# Patient Record
Sex: FEMALE | Race: WHITE | NOT HISPANIC OR LATINO | Employment: OTHER | URBAN - METROPOLITAN AREA
[De-identification: names, ages, dates, MRNs, and addresses within clinical notes are randomized per-mention and may not be internally consistent; named-entity substitution may affect disease eponyms.]

---

## 2021-04-20 ENCOUNTER — RECORDS - HEALTHEAST (OUTPATIENT)
Dept: LAB | Facility: CLINIC | Age: 71
End: 2021-04-20

## 2021-04-20 LAB
SARS-COV-2 PCR COMMENT: ABNORMAL
SARS-COV-2 RNA SPEC QL NAA+PROBE: POSITIVE
SARS-COV-2 VIRUS SPECIMEN SOURCE: ABNORMAL

## 2021-04-24 ENCOUNTER — RECORDS - HEALTHEAST (OUTPATIENT)
Dept: LAB | Facility: CLINIC | Age: 71
End: 2021-04-24

## 2021-05-30 ENCOUNTER — RECORDS - HEALTHEAST (OUTPATIENT)
Dept: ADMINISTRATIVE | Facility: CLINIC | Age: 71
End: 2021-05-30

## 2022-08-18 ENCOUNTER — HOSPITAL ENCOUNTER (INPATIENT)
Facility: HOSPITAL | Age: 72
LOS: 4 days | Discharge: HOME | DRG: 280 | End: 2022-08-22
Attending: EMERGENCY MEDICINE | Admitting: INTERNAL MEDICINE
Payer: MEDICARE

## 2022-08-18 ENCOUNTER — APPOINTMENT (OUTPATIENT)
Dept: RADIOLOGY | Facility: HOSPITAL | Age: 72
DRG: 280 | End: 2022-08-18
Attending: EMERGENCY MEDICINE
Payer: MEDICARE

## 2022-08-18 ENCOUNTER — APPOINTMENT (OUTPATIENT)
Dept: CARDIOLOGY | Facility: HOSPITAL | Age: 72
DRG: 280 | End: 2022-08-18
Attending: EMERGENCY MEDICINE
Payer: MEDICARE

## 2022-08-18 ENCOUNTER — APPOINTMENT (OUTPATIENT)
Dept: CT IMAGING | Facility: HOSPITAL | Age: 72
DRG: 280 | End: 2022-08-18
Attending: EMERGENCY MEDICINE
Payer: MEDICARE

## 2022-08-18 DIAGNOSIS — J81.0 ACUTE PULMONARY EDEMA (H): ICD-10-CM

## 2022-08-18 DIAGNOSIS — J96.01 ACUTE RESPIRATORY FAILURE WITH HYPOXIA (H): ICD-10-CM

## 2022-08-18 DIAGNOSIS — I21.4 NSTEMI (NON-ST ELEVATED MYOCARDIAL INFARCTION) (H): Primary | ICD-10-CM

## 2022-08-18 DIAGNOSIS — E87.29 RESPIRATORY ACIDOSIS: ICD-10-CM

## 2022-08-18 PROBLEM — J81.1 PULMONARY EDEMA: Status: ACTIVE | Noted: 2022-08-18

## 2022-08-18 LAB
ALBUMIN SERPL-MCNC: 3.4 G/DL (ref 3.5–5)
ALP SERPL-CCNC: 112 U/L (ref 45–120)
ALT SERPL W P-5'-P-CCNC: 43 U/L (ref 0–45)
ANION GAP SERPL CALCULATED.3IONS-SCNC: 12 MMOL/L (ref 5–18)
APTT PPP: 27 SECONDS (ref 22–38)
AST SERPL W P-5'-P-CCNC: 52 U/L (ref 0–40)
ATRIAL RATE - MUSE: 122 BPM
ATRIAL RATE - MUSE: 134 BPM
BASE EXCESS BLDV CALC-SCNC: -10.8 MMOL/L
BASOPHILS # BLD MANUAL: 0 10E3/UL (ref 0–0.2)
BASOPHILS NFR BLD MANUAL: 0 %
BILIRUB SERPL-MCNC: 0.6 MG/DL (ref 0–1)
BNP SERPL-MCNC: 1016 PG/ML (ref 0–127)
BUN SERPL-MCNC: 20 MG/DL (ref 8–28)
CALCIUM SERPL-MCNC: 8.7 MG/DL (ref 8.5–10.5)
CHLORIDE BLD-SCNC: 108 MMOL/L (ref 98–107)
CO2 SERPL-SCNC: 20 MMOL/L (ref 22–31)
CREAT SERPL-MCNC: 0.88 MG/DL (ref 0.6–1.1)
CREAT SERPL-MCNC: 1.18 MG/DL (ref 0.6–1.1)
D DIMER PPP FEU-MCNC: 1.37 UG/ML FEU (ref 0–0.5)
DIASTOLIC BLOOD PRESSURE - MUSE: NORMAL MMHG
DIASTOLIC BLOOD PRESSURE - MUSE: NORMAL MMHG
EOSINOPHIL # BLD MANUAL: 0.2 10E3/UL (ref 0–0.7)
EOSINOPHIL NFR BLD MANUAL: 1 %
ERYTHROCYTE [DISTWIDTH] IN BLOOD BY AUTOMATED COUNT: 14.3 % (ref 10–15)
ERYTHROCYTE [DISTWIDTH] IN BLOOD BY AUTOMATED COUNT: 14.5 % (ref 10–15)
GFR SERPL CREATININE-BSD FRML MDRD: 49 ML/MIN/1.73M2
GFR SERPL CREATININE-BSD FRML MDRD: 69 ML/MIN/1.73M2
GLUCOSE BLD-MCNC: 376 MG/DL (ref 70–125)
GLUCOSE BLDC GLUCOMTR-MCNC: 107 MG/DL (ref 70–99)
GLUCOSE BLDC GLUCOMTR-MCNC: 122 MG/DL (ref 70–99)
GLUCOSE BLDC GLUCOMTR-MCNC: 96 MG/DL (ref 70–99)
HCO3 BLDV-SCNC: 15 MMOL/L (ref 24–30)
HCT VFR BLD AUTO: 48.7 % (ref 35–47)
HCT VFR BLD AUTO: 54.4 % (ref 35–47)
HGB BLD-MCNC: 16.3 G/DL (ref 11.7–15.7)
HGB BLD-MCNC: 17.2 G/DL (ref 11.7–15.7)
HOLD SPECIMEN: NORMAL
INR PPP: 1.13 (ref 0.85–1.15)
INTERPRETATION ECG - MUSE: NORMAL
INTERPRETATION ECG - MUSE: NORMAL
LVEF ECHO: NORMAL
LYMPHOCYTES # BLD MANUAL: 9.9 10E3/UL (ref 0.8–5.3)
LYMPHOCYTES NFR BLD MANUAL: 51 %
MAGNESIUM SERPL-MCNC: 1.9 MG/DL (ref 1.8–2.6)
MAGNESIUM SERPL-MCNC: 2.2 MG/DL (ref 1.8–2.6)
MCH RBC QN AUTO: 30.4 PG (ref 26.5–33)
MCH RBC QN AUTO: 30.5 PG (ref 26.5–33)
MCHC RBC AUTO-ENTMCNC: 31.6 G/DL (ref 31.5–36.5)
MCHC RBC AUTO-ENTMCNC: 33.5 G/DL (ref 31.5–36.5)
MCV RBC AUTO: 91 FL (ref 78–100)
MCV RBC AUTO: 97 FL (ref 78–100)
MONOCYTES # BLD MANUAL: 1.2 10E3/UL (ref 0–1.3)
MONOCYTES NFR BLD MANUAL: 6 %
NEUTROPHILS # BLD MANUAL: 8.2 10E3/UL (ref 1.6–8.3)
NEUTROPHILS NFR BLD MANUAL: 42 %
OXYHGB MFR BLDV: 82.7 % (ref 70–75)
P AXIS - MUSE: 57 DEGREES
P AXIS - MUSE: 62 DEGREES
PCO2 BLDV: 68 MM HG (ref 35–50)
PH BLDV: 7.07 [PH] (ref 7.35–7.45)
PLAT MORPH BLD: ABNORMAL
PLATELET # BLD AUTO: 239 10E3/UL (ref 150–450)
PLATELET # BLD AUTO: 294 10E3/UL (ref 150–450)
PO2 BLDV: 69 MM HG (ref 25–47)
POTASSIUM BLD-SCNC: 3.7 MMOL/L (ref 3.5–5)
POTASSIUM BLD-SCNC: 3.9 MMOL/L (ref 3.5–5)
PR INTERVAL - MUSE: 138 MS
PR INTERVAL - MUSE: 146 MS
PROCALCITONIN SERPL-MCNC: 0.02 NG/ML (ref 0–0.49)
PROT SERPL-MCNC: 6.5 G/DL (ref 6–8)
QRS DURATION - MUSE: 80 MS
QRS DURATION - MUSE: 84 MS
QT - MUSE: 290 MS
QT - MUSE: 320 MS
QTC - MUSE: 433 MS
QTC - MUSE: 456 MS
R AXIS - MUSE: -12 DEGREES
R AXIS - MUSE: -3 DEGREES
RBC # BLD AUTO: 5.36 10E6/UL (ref 3.8–5.2)
RBC # BLD AUTO: 5.64 10E6/UL (ref 3.8–5.2)
RBC MORPH BLD: ABNORMAL
SAO2 % BLDV: 84 % (ref 70–75)
SARS-COV-2 RNA RESP QL NAA+PROBE: NEGATIVE
SODIUM SERPL-SCNC: 140 MMOL/L (ref 136–145)
SYSTOLIC BLOOD PRESSURE - MUSE: NORMAL MMHG
SYSTOLIC BLOOD PRESSURE - MUSE: NORMAL MMHG
T AXIS - MUSE: 105 DEGREES
T AXIS - MUSE: 97 DEGREES
TROPONIN I SERPL-MCNC: 0.1 NG/ML (ref 0–0.29)
TROPONIN I SERPL-MCNC: 0.88 NG/ML (ref 0–0.29)
TROPONIN I SERPL-MCNC: 3.39 NG/ML (ref 0–0.29)
TROPONIN I SERPL-MCNC: 5.09 NG/ML (ref 0–0.29)
VENTRICULAR RATE- MUSE: 122 BPM
VENTRICULAR RATE- MUSE: 134 BPM
WBC # BLD AUTO: 13.9 10E3/UL (ref 4–11)
WBC # BLD AUTO: 19.5 10E3/UL (ref 4–11)

## 2022-08-18 PROCEDURE — 82565 ASSAY OF CREATININE: CPT | Performed by: INTERNAL MEDICINE

## 2022-08-18 PROCEDURE — 83880 ASSAY OF NATRIURETIC PEPTIDE: CPT | Performed by: EMERGENCY MEDICINE

## 2022-08-18 PROCEDURE — 82805 BLOOD GASES W/O2 SATURATION: CPT | Performed by: EMERGENCY MEDICINE

## 2022-08-18 PROCEDURE — 85379 FIBRIN DEGRADATION QUANT: CPT | Performed by: EMERGENCY MEDICINE

## 2022-08-18 PROCEDURE — 84132 ASSAY OF SERUM POTASSIUM: CPT | Performed by: INTERNAL MEDICINE

## 2022-08-18 PROCEDURE — 5A09357 ASSISTANCE WITH RESPIRATORY VENTILATION, LESS THAN 24 CONSECUTIVE HOURS, CONTINUOUS POSITIVE AIRWAY PRESSURE: ICD-10-PCS | Performed by: INTERNAL MEDICINE

## 2022-08-18 PROCEDURE — 250N000013 HC RX MED GY IP 250 OP 250 PS 637: Performed by: EMERGENCY MEDICINE

## 2022-08-18 PROCEDURE — 258N000003 HC RX IP 258 OP 636: Performed by: INTERNAL MEDICINE

## 2022-08-18 PROCEDURE — 250N000011 HC RX IP 250 OP 636: Performed by: INTERNAL MEDICINE

## 2022-08-18 PROCEDURE — 93005 ELECTROCARDIOGRAM TRACING: CPT | Performed by: STUDENT IN AN ORGANIZED HEALTH CARE EDUCATION/TRAINING PROGRAM

## 2022-08-18 PROCEDURE — 85730 THROMBOPLASTIN TIME PARTIAL: CPT | Performed by: EMERGENCY MEDICINE

## 2022-08-18 PROCEDURE — U0003 INFECTIOUS AGENT DETECTION BY NUCLEIC ACID (DNA OR RNA); SEVERE ACUTE RESPIRATORY SYNDROME CORONAVIRUS 2 (SARS-COV-2) (CORONAVIRUS DISEASE [COVID-19]), AMPLIFIED PROBE TECHNIQUE, MAKING USE OF HIGH THROUGHPUT TECHNOLOGIES AS DESCRIBED BY CMS-2020-01-R: HCPCS | Performed by: EMERGENCY MEDICINE

## 2022-08-18 PROCEDURE — 250N000011 HC RX IP 250 OP 636: Performed by: EMERGENCY MEDICINE

## 2022-08-18 PROCEDURE — C9803 HOPD COVID-19 SPEC COLLECT: HCPCS

## 2022-08-18 PROCEDURE — 96374 THER/PROPH/DIAG INJ IV PUSH: CPT

## 2022-08-18 PROCEDURE — 84484 ASSAY OF TROPONIN QUANT: CPT | Performed by: EMERGENCY MEDICINE

## 2022-08-18 PROCEDURE — 80053 COMPREHEN METABOLIC PANEL: CPT | Performed by: EMERGENCY MEDICINE

## 2022-08-18 PROCEDURE — 85520 HEPARIN ASSAY: CPT | Performed by: INTERNAL MEDICINE

## 2022-08-18 PROCEDURE — G1010 CDSM STANSON: HCPCS

## 2022-08-18 PROCEDURE — 85027 COMPLETE CBC AUTOMATED: CPT | Performed by: EMERGENCY MEDICINE

## 2022-08-18 PROCEDURE — 82040 ASSAY OF SERUM ALBUMIN: CPT | Performed by: EMERGENCY MEDICINE

## 2022-08-18 PROCEDURE — 255N000002 HC RX 255 OP 636: Performed by: INTERNAL MEDICINE

## 2022-08-18 PROCEDURE — 83735 ASSAY OF MAGNESIUM: CPT | Performed by: INTERNAL MEDICINE

## 2022-08-18 PROCEDURE — 85007 BL SMEAR W/DIFF WBC COUNT: CPT | Performed by: EMERGENCY MEDICINE

## 2022-08-18 PROCEDURE — 99291 CRITICAL CARE FIRST HOUR: CPT | Mod: 25

## 2022-08-18 PROCEDURE — 200N000001 HC R&B ICU

## 2022-08-18 PROCEDURE — 85014 HEMATOCRIT: CPT | Performed by: INTERNAL MEDICINE

## 2022-08-18 PROCEDURE — 93306 TTE W/DOPPLER COMPLETE: CPT | Mod: 26 | Performed by: INTERNAL MEDICINE

## 2022-08-18 PROCEDURE — C9113 INJ PANTOPRAZOLE SODIUM, VIA: HCPCS | Performed by: INTERNAL MEDICINE

## 2022-08-18 PROCEDURE — 250N000013 HC RX MED GY IP 250 OP 250 PS 637: Performed by: INTERNAL MEDICINE

## 2022-08-18 PROCEDURE — 85610 PROTHROMBIN TIME: CPT | Performed by: EMERGENCY MEDICINE

## 2022-08-18 PROCEDURE — 36415 COLL VENOUS BLD VENIPUNCTURE: CPT | Performed by: EMERGENCY MEDICINE

## 2022-08-18 PROCEDURE — 36415 COLL VENOUS BLD VENIPUNCTURE: CPT | Performed by: INTERNAL MEDICINE

## 2022-08-18 PROCEDURE — 99222 1ST HOSP IP/OBS MODERATE 55: CPT | Performed by: INTERNAL MEDICINE

## 2022-08-18 PROCEDURE — 93005 ELECTROCARDIOGRAM TRACING: CPT | Performed by: EMERGENCY MEDICINE

## 2022-08-18 PROCEDURE — 84145 PROCALCITONIN (PCT): CPT | Performed by: INTERNAL MEDICINE

## 2022-08-18 PROCEDURE — 94660 CPAP INITIATION&MGMT: CPT

## 2022-08-18 PROCEDURE — 999N000157 HC STATISTIC RCP TIME EA 10 MIN

## 2022-08-18 PROCEDURE — 250N000009 HC RX 250

## 2022-08-18 PROCEDURE — 71045 X-RAY EXAM CHEST 1 VIEW: CPT

## 2022-08-18 PROCEDURE — 96375 TX/PRO/DX INJ NEW DRUG ADDON: CPT

## 2022-08-18 PROCEDURE — 99291 CRITICAL CARE FIRST HOUR: CPT | Performed by: INTERNAL MEDICINE

## 2022-08-18 PROCEDURE — 84484 ASSAY OF TROPONIN QUANT: CPT | Performed by: INTERNAL MEDICINE

## 2022-08-18 PROCEDURE — 999N000185 HC STATISTIC TRANSPORT TIME EA 15 MIN

## 2022-08-18 RX ORDER — NICOTINE POLACRILEX 4 MG
15-30 LOZENGE BUCCAL
Status: DISCONTINUED | OUTPATIENT
Start: 2022-08-18 | End: 2022-08-22 | Stop reason: HOSPADM

## 2022-08-18 RX ORDER — NAPROXEN SODIUM 220 MG
220 TABLET ORAL 2 TIMES DAILY PRN
Status: ON HOLD | COMMUNITY
End: 2022-08-22

## 2022-08-18 RX ORDER — DEXTROSE MONOHYDRATE 25 G/50ML
25-50 INJECTION, SOLUTION INTRAVENOUS
Status: DISCONTINUED | OUTPATIENT
Start: 2022-08-18 | End: 2022-08-22 | Stop reason: HOSPADM

## 2022-08-18 RX ORDER — LORAZEPAM 2 MG/ML
1 INJECTION INTRAMUSCULAR ONCE
Status: COMPLETED | OUTPATIENT
Start: 2022-08-18 | End: 2022-08-18

## 2022-08-18 RX ORDER — LEVOFLOXACIN 5 MG/ML
750 INJECTION, SOLUTION INTRAVENOUS EVERY 24 HOURS
Status: DISCONTINUED | OUTPATIENT
Start: 2022-08-19 | End: 2022-08-20 | Stop reason: ALTCHOICE

## 2022-08-18 RX ORDER — LEVOFLOXACIN 5 MG/ML
500 INJECTION, SOLUTION INTRAVENOUS EVERY 24 HOURS
Status: DISCONTINUED | OUTPATIENT
Start: 2022-08-18 | End: 2022-08-18

## 2022-08-18 RX ORDER — FUROSEMIDE 10 MG/ML
20 INJECTION INTRAMUSCULAR; INTRAVENOUS ONCE
Status: COMPLETED | OUTPATIENT
Start: 2022-08-18 | End: 2022-08-18

## 2022-08-18 RX ORDER — ENOXAPARIN SODIUM 100 MG/ML
40 INJECTION SUBCUTANEOUS EVERY 24 HOURS
Status: DISCONTINUED | OUTPATIENT
Start: 2022-08-18 | End: 2022-08-18

## 2022-08-18 RX ORDER — LEVOFLOXACIN 5 MG/ML
500 INJECTION, SOLUTION INTRAVENOUS ONCE
Status: COMPLETED | OUTPATIENT
Start: 2022-08-18 | End: 2022-08-18

## 2022-08-18 RX ORDER — POTASSIUM CHLORIDE 1500 MG/1
20 TABLET, EXTENDED RELEASE ORAL ONCE
Status: COMPLETED | OUTPATIENT
Start: 2022-08-18 | End: 2022-08-18

## 2022-08-18 RX ORDER — NITROGLYCERIN 0.4 MG/1
0.4 TABLET SUBLINGUAL EVERY 5 MIN PRN
Status: DISCONTINUED | OUTPATIENT
Start: 2022-08-18 | End: 2022-08-22 | Stop reason: HOSPADM

## 2022-08-18 RX ORDER — FUROSEMIDE 10 MG/ML
80 INJECTION INTRAMUSCULAR; INTRAVENOUS ONCE
Status: COMPLETED | OUTPATIENT
Start: 2022-08-18 | End: 2022-08-18

## 2022-08-18 RX ORDER — LEVOFLOXACIN 5 MG/ML
250 INJECTION, SOLUTION INTRAVENOUS EVERY 24 HOURS
Status: DISCONTINUED | OUTPATIENT
Start: 2022-08-19 | End: 2022-08-18 | Stop reason: DRUGHIGH

## 2022-08-18 RX ORDER — ASPIRIN 81 MG/1
324 TABLET, CHEWABLE ORAL ONCE
Status: COMPLETED | OUTPATIENT
Start: 2022-08-18 | End: 2022-08-18

## 2022-08-18 RX ORDER — HEPARIN SODIUM 10000 [USP'U]/100ML
0-5000 INJECTION, SOLUTION INTRAVENOUS CONTINUOUS
Status: DISCONTINUED | OUTPATIENT
Start: 2022-08-18 | End: 2022-08-19

## 2022-08-18 RX ORDER — NITROGLYCERIN 20 MG/100ML
10-200 INJECTION INTRAVENOUS CONTINUOUS
Status: DISCONTINUED | OUTPATIENT
Start: 2022-08-18 | End: 2022-08-18

## 2022-08-18 RX ORDER — ALBUTEROL SULFATE 0.83 MG/ML
SOLUTION RESPIRATORY (INHALATION)
Status: COMPLETED
Start: 2022-08-18 | End: 2022-08-18

## 2022-08-18 RX ORDER — IOPAMIDOL 755 MG/ML
75 INJECTION, SOLUTION INTRAVASCULAR ONCE
Status: COMPLETED | OUTPATIENT
Start: 2022-08-18 | End: 2022-08-18

## 2022-08-18 RX ORDER — NITROGLYCERIN 20 MG/100ML
10-200 INJECTION INTRAVENOUS CONTINUOUS
Status: DISCONTINUED | OUTPATIENT
Start: 2022-08-18 | End: 2022-08-19

## 2022-08-18 RX ADMIN — ASPIRIN 81 MG 324 MG: 81 TABLET ORAL at 05:45

## 2022-08-18 RX ADMIN — PANTOPRAZOLE SODIUM 40 MG: 40 INJECTION, POWDER, FOR SOLUTION INTRAVENOUS at 10:15

## 2022-08-18 RX ADMIN — LEVOFLOXACIN 500 MG: 5 INJECTION, SOLUTION INTRAVENOUS at 07:30

## 2022-08-18 RX ADMIN — HEPARIN SODIUM AND DEXTROSE 900 UNITS/HR: 10000; 5 INJECTION INTRAVENOUS at 18:22

## 2022-08-18 RX ADMIN — ENOXAPARIN SODIUM 40 MG: 40 INJECTION SUBCUTANEOUS at 10:15

## 2022-08-18 RX ADMIN — FUROSEMIDE 80 MG: 10 INJECTION, SOLUTION INTRAMUSCULAR; INTRAVENOUS at 13:04

## 2022-08-18 RX ADMIN — NITROGLYCERIN 0.4 MG: 0.4 TABLET SUBLINGUAL at 05:43

## 2022-08-18 RX ADMIN — PERFLUTREN 3 ML: 6.52 INJECTION, SUSPENSION INTRAVENOUS at 11:00

## 2022-08-18 RX ADMIN — FUROSEMIDE 20 MG: 10 INJECTION, SOLUTION INTRAMUSCULAR; INTRAVENOUS at 08:35

## 2022-08-18 RX ADMIN — POTASSIUM CHLORIDE 20 MEQ: 1500 TABLET, EXTENDED RELEASE ORAL at 16:45

## 2022-08-18 RX ADMIN — FUROSEMIDE 10 MG/HR: 10 INJECTION, SOLUTION INTRAVENOUS at 14:01

## 2022-08-18 RX ADMIN — ALBUTEROL SULFATE 2.5 MG: 2.5 SOLUTION RESPIRATORY (INHALATION) at 05:51

## 2022-08-18 RX ADMIN — LORAZEPAM 1 MG: 2 INJECTION, SOLUTION INTRAMUSCULAR; INTRAVENOUS at 05:51

## 2022-08-18 RX ADMIN — IOPAMIDOL 75 ML: 755 INJECTION, SOLUTION INTRAVENOUS at 07:51

## 2022-08-18 RX ADMIN — NITROGLYCERIN 10 MCG/MIN: 20 INJECTION INTRAVENOUS at 05:46

## 2022-08-18 ASSESSMENT — ACTIVITIES OF DAILY LIVING (ADL)
ADLS_ACUITY_SCORE: 35
DIFFICULTY_COMMUNICATING: NO
ADLS_ACUITY_SCORE: 18
WEAR_GLASSES_OR_BLIND: NO
CHANGE_IN_FUNCTIONAL_STATUS_SINCE_ONSET_OF_CURRENT_ILLNESS/INJURY: NO
ADLS_ACUITY_SCORE: 18
ADLS_ACUITY_SCORE: 18
CONCENTRATING,_REMEMBERING_OR_MAKING_DECISIONS_DIFFICULTY: NO
ADLS_ACUITY_SCORE: 35
FALL_HISTORY_WITHIN_LAST_SIX_MONTHS: NO
WALKING_OR_CLIMBING_STAIRS_DIFFICULTY: NO
ADLS_ACUITY_SCORE: 35
ADLS_ACUITY_SCORE: 21
DRESSING/BATHING_DIFFICULTY: NO
DOING_ERRANDS_INDEPENDENTLY_DIFFICULTY: NO
DIFFICULTY_EATING/SWALLOWING: NO
ADLS_ACUITY_SCORE: 18
ADLS_ACUITY_SCORE: 18
HEARING_DIFFICULTY_OR_DEAF: NO
DEPENDENT_IADLS:: INDEPENDENT
TOILETING_ISSUES: NO

## 2022-08-18 ASSESSMENT — ENCOUNTER SYMPTOMS: SHORTNESS OF BREATH: 1

## 2022-08-18 NOTE — PROGRESS NOTES
SPIRITUAL HEALTH SERVICES Progress Note      Saw pt Radha Hansen per admission screening.    Illness Narrative - Radha shared about her condition and the various options that she has. She is leaning towards having stent placement instead of surgery.    Distress - Ongoing health challenges and cardiac interventions.    Coping - Patient comes from [employer] jud[employer] background and derives meaning, purpose, and comfort from judith. She shared aspects of her judith journey that inform who she is today. She is comforted by her belief in heaven. She feels peace knowing that if she dies, she will be in heaven but that she also wants to keep living because she loves her life. Patient welcomed prayer and expressed appreciation for the visit.      Plan - A  will continue to visit as able or per request by patient/family/staff.      Jayson Velazquez MDiv, [facility]  Lead Staff , [facility]  [phone]

## 2022-08-18 NOTE — H&P
"Critical Care Admission Note      08/18/2022    Name: Radha Hansen MRN#: [id number]   Age: 72 year old YOB: 1950                    Problem List:   Active Problems:    Pulmonary edema  Hypoxic respiratory failure  Chest pain  Leukocytosis  Possible pneumonia    Clinically Significant Risk Factors Present on Admission                   # Overweight: Estimated body mass index is 25.16 kg/m  as calculated from the following:    Height as of this encounter: 1.727 m (5' 8\").    Weight as of this encounter: 75.1 kg (165 lb 8 oz).        Will need to clarify CODE STATUS.  Intermittently she says she does not want to be intubated but will wait for her to wake up from her nap and now that she is feeling better see if she truly wants to be DNR/DNI or not.  Keep full code for now until I can have a discussion with her this afternoon.        HPI:   70-year-old female with no significant past medical history.  Patient states that she is occasionally awakened by retrosternal chest pain that goes away after a while.  Has not noticed chest pain with activity.  Today at 3 AM she was awakened with significant retrosternal pain, pressure in character, and difficult to breathe.  Brought to the emergency room by paramedics.  Placed on BiPAP and started on nitroglycerin with improvement.  Chest x-ray with pulmonary edema.    Initial EKG with ST elevations in anterior leads that seem to be improving        Assessment and plan :       I have personally reviewed the labs, imaging studies, cultures and discussed the case with referring physician and consulting physicians.     My assessment and plan by system for this patient is as follows:    Neurology/Psychiatry:   Awake alert and oriented.  No issues      Cardiovascular:   Recurrent chest pain with EKG changes.  Occurs mainly at night when sleeping.  Question Prinzmetal's angina with spasm.  Continue nitroglycerin.  Give a dose of Lasix and see if we can diurese " her.  Monitor troponins.  First 1 negative.  Echo  Cardiology consult    Currently chest pain-free      Pulmonary/Ventilator Management:   Acute pulmonary edema resulting in acute hypoxic respiratory failure requiring BiPAP  Cannot rule out pneumonia and with leukocytosis we will cover her with levofloxacin for now    Lasix and see if we can diurese her and get her off BiPAP      GI and Nutrition :   N.p.o. for now while on BiPAP      Renal/Fluids/Electrolytes:   Diuresis    - monitor function and electrolytes as needed with replacement per ICU protocols. - generally avoid nephrotoxic agents such as NSAID, IV contrast unless specifically required  - adjust medications as needed for renal clearance  - follow I/O's as appropriate.    Infectious Disease:   Cannot rule out pneumonia and with elevated WBCs will cover with antibiotics for now.  Dry cough but no fevers and no productive sputum.  I think leukocytosis could be stress-induced.      Endocrine:     - ICU insulin protocol, goal sugar <180      ICU Prophylaxis:   1. DVT: Lovenox  3. Stress Ulcer: PPI           Medical History:     Past Medical History:   Diagnosis Date     Hypertension      History reviewed. No pertinent surgical history.  Social History     Socioeconomic History     Marital status: Single     Spouse name: Not on file     Number of children: Not on file     Years of education: Not on file     Highest education level: Not on file   Occupational History     Not on file   Tobacco Use     Smoking status: Former Smoker     Types: Cigarettes     Smokeless tobacco: Never Used   Substance and Sexual Activity     Alcohol use: Not Currently     Drug use: Not Currently     Sexual activity: Not Currently   Other Topics Concern     Not on file   Social History Narrative     Not on file     Social Determinants of Health     Financial Resource Strain: Not on file   Food Insecurity: Not on file   Transportation Needs: Not on file   Physical Activity: Not on file    Stress: Not on file   Social Connections: Not on file   Intimate Partner Violence: Not on file   Housing Stability: Not on file        Allergies   Allergen Reactions     Penicillins Hives              Key Medications:       [START ON 8/19/2022] levofloxacin  750 mg Intravenous Q24H       nitroGLYcerin 5 mcg/min (08/18/22 0811)        Home Meds  No current facility-administered medications on file prior to encounter.  No current outpatient medications on file prior to encounter.             Physical Examination:   Temp:  [97.5  F (36.4  C)] 97.5  F (36.4  C)  Pulse:  [] 96  Resp:  [22-54] 22  BP: (107-220)/() 136/65  FiO2 (%):  [70 %-80 %] 70 %  SpO2:  [62 %-100 %] 100 %  No intake or output data in the 24 hours ending 08/18/22 0906  Wt Readings from Last 4 Encounters:   08/18/22 75.1 kg (165 lb 8 oz)     BP - Mean:  [] 92  FiO2 (%): 70 %  Resp: 22    No lab results found in last 7 days.    GEN: no acute distress   HEENT: head ncat, sclera anicteric, OP patent, trachea midline   PULM: unlabored synchronous with BiPAP, bibasilar crackles  CV/COR: RRR S1S2 no gallop,  No rub, no murmur  ABD: soft nontender, hypoactive bowel sounds, no mass  EXT: No significant edema  NEURO: grossly intact  SKIN: no obvious rash  LINES: clean, dry intact         Data:   All data and imaging reviewed     ROUTINE ICU LABS (Last four results)  CMP  Recent Labs   Lab 08/18/22  0550      POTASSIUM 3.9   CHLORIDE 108*   CO2 20*   ANIONGAP 12   *   BUN 20   CR 1.18*   GFRESTIMATED 49*   JENNIFFER 8.7   PROTTOTAL 6.5   ALBUMIN 3.4*   BILITOTAL 0.6   ALKPHOS 112   AST 52*   ALT 43     CBC  Recent Labs   Lab 08/18/22  0550   WBC 19.5*   RBC 5.64*   HGB 17.2*   HCT 54.4*   MCV 97   MCH 30.5   MCHC 31.6   RDW 14.3        INR  Recent Labs   Lab 08/18/22  0550   INR 1.13     Arterial Blood GasNo lab results found in last 7 days.    All cultures:  No results for input(s): CULT in the last 168 hours.  Recent Results  (from the past 24 hour(s))   XR Chest Port 1 View    Narrative    EXAM: XR CHEST PORT 1 VIEW  LOCATION: [facility]  DATE/TIME: 8/18/2022 5:34 AM    INDICATION: STEMI  COMPARISON: None.      Impression    IMPRESSION: Increased interstitial lung markings with hazy bibasilar consolidations. Findings are most likely reflecting of pulmonary edema. No pneumothorax or large pleural effusion. The cardiomediastinal silhouette is within normal limits.   CT Chest Pulmonary Embolism w Contrast    Narrative    EXAM: CT CHEST PULMONARY EMBOLISM W CONTRAST  LOCATION: [facility]  DATE/TIME: 8/18/2022 7:49 AM    INDICATION: elevated ddimer, chest pain, shortness of breath  COMPARISON: None.  TECHNIQUE: CT chest pulmonary angiogram during arterial phase injection of IV contrast. Multiplanar reformats and MIP reconstructions were performed. Dose reduction techniques were used.   CONTRAST: Isovue 370     75ml    FINDINGS:  ANGIOGRAM CHEST: No pulmonary artery filling defects. The aorta is normal in caliber.    LUNGS AND PLEURA: No edema. Small bilateral effusions and associated atelectasis. Scattered groundglass opacities and interlobular septal thickening. Mild bronchial wall thickening. No pneumothorax. There are a few scattered pulmonary nodules with the   largest in the right middle lobe measuring 5 mm on series 6 image 168    MEDIASTINUM/AXILLAE: Cardiomegaly with left ventricular hypertrophy. No adenopathy.    CORONARY ARTERY CALCIFICATION: Mild.    UPPER ABDOMEN: No significant finding.    MUSCULOSKELETAL: T5 vertebral body hemangioma. Degenerative changes of the spine.      Impression    IMPRESSION:  1.  No pulmonary embolus.  2.  Cardiomegaly and pulmonary edema. Superimposed infection is also the differential.  3.  Small bilateral effusions.  4.  Scattered pulmonary micronodules measuring up to 5 mm. Follow-up guidelines as below.    REFERENCE:  Guidelines for Management of  Incidental Pulmonary Nodules Detected on CT Images: From the Fleischner Society 2017.   Guidelines apply to incidental nodules in patients who are 35 years or older.  Guidelines do not apply to lung cancer screening, patients with immunosuppression, or patients with known primary cancer.    MULTIPLE NODULES  Nodule size <6 mm  Low-risk patients: No follow-up needed.  High-risk patients: Optional follow-up at 12 months.    Consider referral to lung nodule clinic.           Billing: This patient is critically ill: Yes. Total critical care time today 50 min exclusive of procedures or teaching.  Managing acute hypoxic respiratory failure requiring BiPAP

## 2022-08-18 NOTE — ED NOTES
Pt came in with no paperwork (only cell phone). Unable to find family info to contact. Did find a few contacts in her phone that were possible family members. msg left for Reene and John Paul Hansen was attempted to be called with no answer. Pt awaiting ICU bed. Stabilized at this time. Remains on low dose ntg gtt. 2 ivs remain patent and secure. Pt less diaphoretic and appears more relaxed after Ativan and BIPAP tx.

## 2022-08-18 NOTE — PROGRESS NOTES
Care Management Note    Length of Stay (days): 0    Expected Discharge Date:   To be determined.     Concerns to be Addressed:   Alteration in cardiopulmonary status requiring BiPAP (70-80% FiO2). Levaquin IV. (now on nasal cannula).   Patient plan of care discussed at interdisciplinary rounds: Yes    Anticipated Discharge Disposition:  To be determined by destination, patient/family preferences, medical needs and mobility status closer to the time of discharge.     Anticipated Discharge Services:  To be determined.   Anticipated Discharge DME:  To be determined.     Patient/family educated on Medicare website which has current facility and service quality ratings:  NA  Education Provided on the Discharge Plan:  Per team  Patient/Family in Agreement with the Plan:  yes    Referrals Placed by CM/SW:  [facility] [employer] [facility]  Private pay costs discussed: Not applicable     Additional Information:  .    11:52 AM:  Per Montrell at [employer], patient is an employee and NOT a resident. The address on the face sheet is where she works. He will look in the HR file to find an emergency contact and call writer back.   12:21 PM:  Met with patient to review the above information. She clarified that she works at [employer] and lives at [address] [place] [address] [id number] in [place] ([id number]). Confirmed her phone number and updated face sheet. She prefers her friends Valencia and Micah Walton act as her emergency contacts: [phone].  Her goal is to return home at discharge but care management will follow along.     Lesly Guadarrama RN

## 2022-08-18 NOTE — PROGRESS NOTES
Transported patient from ED to CT then to 351 on BiPAP without incident.  Francisco Reynolds, RT

## 2022-08-18 NOTE — ED NOTES
Bed: JNED-14  Expected date: 8/18/22  Expected time: 5:24 AM  Means of arrival: Ambulance  Comments:  Aislinn  72 F, STEMI

## 2022-08-18 NOTE — DISCHARGE INSTRUCTIONS
The telephone number for [facility], if you would like to choose a [facility] primary care physician, is [phone]. The [facility] can check availability and arrange an appointment for you.

## 2022-08-18 NOTE — PROVIDER NOTIFICATION
"Paged Dr. Chisholm @ [phone] in regards to, \"Elevated Trop 3.39. Please advise.\"     Per Dr. Patrick, \" heparin gtt to be started and Dr. Salinas would address tomorrow.\"  "

## 2022-08-18 NOTE — PROGRESS NOTES
"ED RESPIRATORY CARE NOTE       RT was called to the bedside. Pt  in significant respiratory distress. Sats 70s with labored breathing and marked gasping. Pt was placed on Bipap S/T 12/5, rate 16, 80%. Pt sats increased to 100%. Pt tolerating well at this time. RT to follow.      BP (!) 220/110   Pulse (!) 127   Resp 27   Ht 1.702 m (5' 7\")   Wt 83.9 kg (185 lb)   SpO2 100%   BMI 28.98 kg/m         Elisabet Candelaria, RT  "

## 2022-08-18 NOTE — ED PROVIDER NOTES
EMERGENCY DEPARTMENT ENCOUNTER      NAME: Radha Hansen  AGE: 72 year old female  YOB: 1950  MRN: [id number]  EVALUATION DATE & TIME: 2022  5:38 AM    PCP: No primary care provider on file.    ED PROVIDER: Jessica Desir M.D.      Chief Complaint   Patient presents with     Chest Pain     Shortness of Breath         FINAL IMPRESSION:  1. Acute respiratory failure with hypoxia (H)    2. Respiratory acidosis    3. Acute pulmonary edema (H)        MEDICAL DECISION MAKIN:07 AM I met with the patient, obtained history, performed an initial exam, and discussed options and plan for diagnostics and treatment here in the ED.   Pertinent Labs & Imaging studies reviewed. (See chart for details)     Radha Hansen is a 72 year old female who presenting in acute respiratory failure.  Oxygen saturation on arrival is 68% on nonrebreather.  She had abdominal accessory muscle use and was acutely ill-appearing.  Initially patient was called as a Cath Lab activation in the field.  However, per my review of the ECG, I feel that the changes on the ECG are more representative of heart strain related to acute hypertension and pulmonary edema rather than STEMI.  Oxygen saturations were imminently decreasing and got as low as 52%.  However the patient repeatedly stated that she did not want to be intubated.  Respiratory therapy was consulted and patient did finally agreed to be on BiPAP.  Additionally, chest x-ray was obtained and reviewed at the bedside which shows pulmonary edema.  She was started on a nitroglycerin drip after receiving both sublingual nitro and aspirin.    I discussed with both the intensive test and the interventional cardiologist.  The intensivist generally agrees that unclear whether this EKG pattern is because from the hypoxia and acidemia with heart strain rather than thrombus.  We will plan an echo to evaluate for wall motion abnormality.  Initial troponin is normal.  Electrolytes show a  BNP of 1016, glucose 376, normal sodium and potassium.  Some acute kidney injury with a creatinine of 1.18.  VBG shows a pH of 7.07 with PCO2 of 68.  Dimer is also elevated as well as white blood cell count.  Chest x-ray shows pulmonary edema.    I added on a CT PE run to evaluate for any other underlying cause of her pulmonary edema.  She will be admitted to the ICU for further cares.  I discussed with the intensivist who accepts her for further management.   She will be signed out to Dr. Cotto pending the results of her CT scan.  If she is positive for PE, will need initiation of heparin.    60 minutes of critical care time.   Critical Care     Performed by: Dr Jessica Desir  Total critical care time: 60 minutes  Critical care was necessary to treat or prevent imminent or life-threatening deterioration of the following conditions:  Acute respiratory failure secondary to hypertension and flash pulmonary edema, respiratory acidosis  Critical care was time spent personally by me on the following activities: development of treatment plan with patient or surrogate, discussions with consultants, examination of patient, evaluation of patient's response to treatment, obtaining history from patient or surrogate, ordering and performing treatments and interventions, ordering and review of laboratory studies, ordering and review of radiographic studies, re-evaluation of patient's condition and monitoring for potential decompensation.  Critical care time was exclusive of separately billable procedures and treating other patients.       MEDICATIONS GIVEN IN THE EMERGENCY:  Medications   nitroGLYcerin (NITROSTAT) sublingual tablet 0.4 mg (0.4 mg Sublingual Given 8/18/22 0543)   nitroGLYcerin 50 mg in D5W 250 mL (adult std) infusion CENTRAL (5 mcg/min Intravenous Rate/Dose Verify 8/18/22 0619)   albuterol (PROVENTIL) (2.5 MG/3ML) 0.083% neb solution (2.5 mg  Given 8/18/22 0551)   LORazepam (ATIVAN) injection 1 mg (1 mg  Intravenous Given 8/18/22 0551)   aspirin (ASA) chewable tablet 324 mg (324 mg Oral Given 8/18/22 0545)       =================================================================    HPI    Patient information was obtained from: patient, EMS    Use of : Use of : N/A       Radha Hansen is a 72 year old female with a history of HLD, HTN, Pulmonary edema (08/18/22) who presents with with shortness of breath.    Per EMS, patient was non-complaint and refused medication for onset constant chest pain and shortness of breath. Patient's O2 was 60% upon arrival and 160/80. Heart rate was 130 BMP. Prior to arrival, patient refused intubating if needed but is not DNI.     REVIEW OF SYSTEMS   Review of Systems   Respiratory: Positive for shortness of breath.    Cardiovascular: Positive for chest pain.   All other systems reviewed and are negative.  Review of systems is limited due to patient acuity    PAST MEDICAL HISTORY:  Past Medical History:   Diagnosis Date     Hypertension        PAST SURGICAL HISTORY:  History reviewed. No pertinent surgical history.    CURRENT MEDICATIONS:      Current Facility-Administered Medications:      nitroGLYcerin (NITROSTAT) sublingual tablet 0.4 mg, 0.4 mg, Sublingual, Q5 Min PRN, Jessica Desir MD, 0.4 mg at 08/18/22 0543     nitroGLYcerin 50 mg in D5W 250 mL (adult std) infusion CENTRAL,  mcg/min, Intravenous, Continuous, Jessica Desir MD, Last Rate: 1.5 mL/hr at 08/18/22 0619, 5 mcg/min at 08/18/22 0619  No current outpatient medications on file.    ALLERGIES:  Allergies   Allergen Reactions     Penicillins Hives       FAMILY HISTORY:  History reviewed. No pertinent family history.    SOCIAL HISTORY:   Social History     Tobacco Use     Smoking status: Former Smoker     Types: Cigarettes     Smokeless tobacco: Never Used   Substance Use Topics     Alcohol use: Not Currently     Drug use: Not Currently        PHYSICAL EXAM:    Vitals: /59   Pulse  "114   Resp 30   Ht 1.702 m (5' 7\")   Wt 83.9 kg (185 lb)   SpO2 98%   BMI 28.98 kg/m     General:.  Pale appearing, in acute respiratory distress  HENT: Oropharynx without erythema or exudates.  Dry mucous membranes  Eyes: Pupils mid-sized.   Neck: Full AROM.  No midline tenderness to palpation.  Cardiovascular: Tachycardic, regular rhythm. Peripheral pulses 2+ bilaterally.  Chest/Pulmonary: Increased work of breathing, accessory muscle use including abdominal breathing, decreased air movement diffusely through both lung fields.  Crackles suggestive of pulmonary edema   Abdomen: Soft, nondistended. Nontender without guarding or rebound.  Extremities: Normal ROM of all major joints. No lower extremity edema.   Skin: Warm and dry.  Mottled  Neuro: Moaning, GCS is 11. Strength and sensation grossly intact to all extremities.      LAB:  All pertinent labs reviewed and interpreted.  Labs Ordered and Resulted from Time of ED Arrival to Time of ED Departure   COMPREHENSIVE METABOLIC PANEL - Abnormal       Result Value    Sodium 140      Potassium 3.9      Chloride 108 (*)     Carbon Dioxide (CO2) 20 (*)     Anion Gap 12      Urea Nitrogen 20      Creatinine 1.18 (*)     Calcium 8.7      Glucose 376 (*)     Alkaline Phosphatase 112      AST 52 (*)     ALT 43      Protein Total 6.5      Albumin 3.4 (*)     Bilirubin Total 0.6      GFR Estimate 49 (*)    D DIMER QUANTITATIVE - Abnormal    D-Dimer Quantitative 1.37 (*)    B-TYPE NATRIURETIC PEPTIDE ([facility] ONLY) - Abnormal    BNP 1,016 (*)    BLOOD GAS VENOUS - Abnormal    pH Venous 7.07 (*)     pCO2 Venous 68 (*)     pO2 Venous 69 (*)     Bicarbonate Venous 15 (*)     Base Excess/Deficit (+/-) -10.8      Oxyhemoglobin Venous 82.7 (*)     O2 Sat, Venous 84.0 (*)    CBC WITH PLATELETS AND DIFFERENTIAL - Abnormal    WBC Count 19.5 (*)     RBC Count 5.64 (*)     Hemoglobin 17.2 (*)     Hematocrit 54.4 (*)     MCV 97      MCH 30.5      MCHC 31.6      RDW 14.3      Platelet " Count 294     DIFFERENTIAL - Abnormal    % Neutrophils 42      % Lymphocytes 51      % Monocytes 6      % Eosinophils 1      % Basophils 0      Absolute Neutrophils 8.2      Absolute Lymphocytes 9.9 (*)     Absolute Monocytes 1.2      Absolute Eosinophils 0.2      Absolute Basophils 0.0      RBC Morphology Confirmed RBC Indices      Platelet Assessment        Value: Automated Count Confirmed. Platelet morphology is normal.   INR - Normal    INR 1.13     PARTIAL THROMBOPLASTIN TIME - Normal    aPTT 27     TROPONIN I - Normal    Troponin I 0.10     COVID-19 VIRUS (CORONAVIRUS) BY PCR   PROCALCITONIN       RADIOLOGY:  XR Chest Port 1 View   Final Result   IMPRESSION: Increased interstitial lung markings with hazy bibasilar consolidations. Findings are most likely reflecting of pulmonary edema. No pneumothorax or large pleural effusion. The cardiomediastinal silhouette is within normal limits.          EKG:   Performed at: 08/18/22 0544  Impression: Sinus tachycardia with occasional premature ventricular complexes. Possible left atrial enlargement. Anteroseptal infarct, age underdetermined. Abnormal ECG  Rate: 134 bpm  Rhythm: sinus tachycardia  QRS Interval: 84 ms  QTc Interval: 433 ms  Comparison: none  I have independently reviewed and interpreted the EKG(s) documented above.     ECG #2  Performed at 6:02 AM  Rate 122  Normal intervals  Sinus tachycardia  Increased elevation in the anterior septal leads.  T wave inversion in aVL  My interpretation is that this reflects strain pattern rather than acute STEMI related to hypertensive crisis and pulmonary edema        PROCEDURES:   Critical Care: 60 minutes    Esther MAURO, am serving as a scribe to document services personally performed by Dr. Jessica Desir  based on my observation and the provider's statements to me. IJessica MD attest that Esther Bolivar is acting in a scribe capacity, has observed my performance of the services and has documented them  in accordance with my direction.      Jessica Desir M.D.  Emergency Medicine  [facility] EMERGENCY DEPARTMENT  [facility] [facility] [id number] [phone]  Dept: [phone]           Jessica Desir MD  08/18/22 0710

## 2022-08-18 NOTE — PLAN OF CARE
Patient was weaned off BiPAP at 1150 am to 2L NC. Sats high 90's. No respt distress or increased WOB noted. She is able to communicate her needs. BiPAP on S/B in the room. Will continue to follow and assess as needed.    Josey Devine, RT

## 2022-08-18 NOTE — PROVIDER NOTIFICATION
"Paged Dr. Salinas in regards, \"Elevated Trop 3.39. Please advise\" Awaiting call back. Robert Mann RN on 8/18/2022 at 5:04 PM    "

## 2022-08-18 NOTE — PLAN OF CARE
Problem: Plan of Care - These are the overarching goals to be used throughout the patient stay.    Goal: Absence of Hospital-Acquired Illness or Injury  Intervention: Identify and Manage Fall Risk  Recent Flowsheet Documentation  Taken 8/18/2022 1200 by Sowmya Nelson RN  Safety Promotion/Fall Prevention:   bed alarm on   lighting adjusted   nonskid shoes/slippers when out of bed   room near nurse's station  Taken 8/18/2022 0800 by Sowmya Nelson RN  Safety Promotion/Fall Prevention:   bed alarm on   lighting adjusted   nonskid shoes/slippers when out of bed   room near nurse's station  Intervention: Prevent Skin Injury  Recent Flowsheet Documentation  Taken 8/18/2022 1200 by Sowmya Nelson RN  Body Position: supine  Taken 8/18/2022 0800 by Sowmya Nelson RN  Body Position: supine  Intervention: Prevent and Manage VTE (Venous Thromboembolism) Risk  Recent Flowsheet Documentation  Taken 8/18/2022 1200 by Sowmya Nelson RN  Range of Motion: active ROM (range of motion) encouraged  Activity Management: bedrest  Taken 8/18/2022 0800 by Sowmya Nelson RN  Range of Motion: active ROM (range of motion) encouraged  Activity Management: bedrest  Intervention: Prevent Infection  Recent Flowsheet Documentation  Taken 8/18/2022 1200 by Sowmya Nelson RN  Infection Prevention:   hand hygiene promoted   equipment surfaces disinfected   rest/sleep promoted   visitors restricted/screened  Taken 8/18/2022 0800 by Sowmya Nelson RN  Infection Prevention:   hand hygiene promoted   equipment surfaces disinfected   rest/sleep promoted   visitors restricted/screened  Goal: Readiness for Transition of Care  Intervention: Mutually Develop Transition Plan  Recent Flowsheet Documentation  Taken 8/18/2022 1117 by Sowmya Nelson RN  Equipment Currently Used at Home: none     Problem: Risk for Delirium  Goal: Optimal Coping  Intervention: Optimize Psychosocial Adjustment to Delirium  Recent Flowsheet Documentation  Taken  8/18/2022 1200 by Sowmya Nelson, RN  Supportive Measures:   active listening utilized   positive reinforcement provided  Taken 8/18/2022 0800 by Sowmya Nelson RN  Supportive Measures:   active listening utilized   positive reinforcement provided  Goal: Improved Behavioral Control  Intervention: Prevent and Manage Agitation  Recent Flowsheet Documentation  Taken 8/18/2022 1200 by Sowmya Nelson, RN  Environment Familiarity/Consistency: daily routine followed  Taken 8/18/2022 0800 by Sowmya Nelson RN  Environment Familiarity/Consistency: daily routine followed   Goal Outcome Evaluation:        [facility] - ICU    RN Progress Note:            Pertinent Assessments:      Please refer to flowsheet rows for full assessment     Patient is alert and orirentedx4, complains of no pain at this time.  nitroglycerin qttt was stopped.  Will continue to monitor.  VSS, was taken off of bipap and is now on 2L NC with normal sats. Lasix gtt started.         Mobility Level:     Bedrest, score of 2         Key Events - This Shift:     Off of bipap, lasix drip started.                Plan:     Continue to monitor output, VSS, pain, bipap if needed.  Await cardiology to see patient.

## 2022-08-18 NOTE — PHARMACY-ADMISSION MEDICATION HISTORY
Pharmacy Note - Admission Medication History    Pertinent Provider Information: Patient also takes an electrolyte powder supplement daily from Dr. Ibrahim's.      ______________________________________________________________________    Prior To Admission (PTA) med list completed and updated in EMR.       PTA Med List   Medication Sig Last Dose     naproxen sodium (ANAPROX) 220 MG tablet Take 220 mg by mouth 2 times daily as needed for moderate pain More than a month at PRN     VITAMIN D-VITAMIN K PO Take 1 capsule by mouth daily Dr. Ibrahim's brand. Contains vitamin D3 10,000 units, vitamin K2 100mcg, zinc 20mg, & magnesium 25mg 8/17/2022 at AM       Information source(s): Patient  Method of interview communication: in-person with surgical mask and faceshield    Summary of Changes to PTA Med List  New: naproxen, vitamin D/K  Discontinued: none  Changed: none    Patient was asked about OTC/herbal products specifically.  PTA med list reflects this.    Allergies were reviewed, assessed, and updated with the patient.      Patient does not use any multi-dose medications prior to admission.    The information provided in this note is only as accurate as the sources available at the time of the update(s).    Thank you for the opportunity to participate in the care of this patient.    [facility], located [facility]  8/18/2022 1:10 PM

## 2022-08-18 NOTE — CONSULTS
HEART CARE NOTE        Thank you, Dr. Brown, for asking the [facility] team to see Radha Hansen to evaluate ADHF c/b cardiogenic pulmonary edema.      Assessment/Recommendations     1. ADHF c/b cardiogenic pulmonary edema  Assessment / Plan    Hypervolemic on physical exam; endorses HF symptoms; good response to initial bolus of IV furosemide - will start gtt after furosemide bolus    Echo results pending - will determine GDMT once results final    Clinically Significant Risk Factors Present on Admission                        Fluid & Electrolyte Disorders: Fluid overload, unspecified, Other fluid overload and Other disorders of electrolyte and fluid balance, not elsewhere classified    Addendum:  Echo significant for severe LV systolic dysfunction and WMAs in the setting of + troponin x1 (0.88); Patient remains hemodynamically stable and chest pain free; will make NPO after midnight with plans for coronary angiogram +/- PCI tomorrow if patient is amenable.    History of Present Illness/Subjective    Ms. Radha Hansen is a 72 year old female with no significant past cardiac history who presents in cardiogenic pulmonary.    Today, Mrs. Hansen endorses progressive dyspnea on exertion over the last week. She reports that she has some dyspnea at baseline, but it has been more apparent requiring less and less effort. She reports that her symptoms progressed to the point at rest, and she was prompted to present to the nearest ED because she experienced PND. She reports associated orthopnea as well as fluid retention/edema add weight gain. She reports improvement in her symptoms since admission and IV diuresis; Management plan as detailed above.    Of note, patient denies any chest pain (asked several times in various ways). She reports some indigestion which has been intermittent. She reports that dyspnea has caused her to wake suddenly from her sleep, but has never had chest pressure, heaviness, pain,  "discomfort.     ECG: Personally reviewed.Sinus tachycardia    ECHO: results pending    CT chest:  IMPRESSION:  1.  No pulmonary embolus.  2.  Cardiomegaly and pulmonary edema. Superimposed infection is also the differential.  3.  Small bilateral effusions.  4.  Scattered pulmonary micronodules measuring up to 5 mm. Follow-up guidelines as below.        Physical Examination Review of Systems   /61   Pulse 91   Temp 97.5  F (36.4  C)   Resp 17   Ht 1.727 m (5' 8\")   Wt 75.1 kg (165 lb 8 oz)   SpO2 98%   BMI 25.16 kg/m    Body mass index is 25.16 kg/m .  Wt Readings from Last 3 Encounters:   08/18/22 75.1 kg (165 lb 8 oz)     General Appearance:   no distress, normal body habitus   ENT/Mouth: membranes moist, no oral lesions or bleeding gums.      EYES:  no scleral icterus, normal conjunctivae   Neck: no carotid bruits or thyromegaly   Chest/Lungs:   lungs are clear to auscultation, no rales or wheezing, equal chest wall expansion    Cardiovascular:   Regular. Normal first and second heart sounds with no murmurs, rubs, or gallops; the carotid, radial and posterior tibial pulses are intact, +JVD and LE edema bilaterally    Abdomen:  no organomegaly, masses, bruits, or tenderness; bowel sounds are present   Extremities: no cyanosis or clubbing   Skin: no xanthelasma, warm.    Neurologic: alert and oriented x3, calm     Psychiatric: alert and oriented x3, calm     A complete 10 systems ROS was reviewed  And is negative except what is listed in the HPI.          Medical History  Surgical History Family History Social History   Past Medical History:   Diagnosis Date     Hypertension     History reviewed. No pertinent surgical history. no family history of premature coronary artery disease Social History     Socioeconomic History     Marital status: Single     Spouse name: Not on file     Number of children: Not on file     Years of education: Not on file     Highest education level: Not on file   Occupational " History     Not on file   Tobacco Use     Smoking status: Former Smoker     Types: Cigarettes     Smokeless tobacco: Never Used   Substance and Sexual Activity     Alcohol use: Not Currently     Drug use: Not Currently     Sexual activity: Not Currently   Other Topics Concern     Not on file   Social History Narrative     Not on file     Social Determinants of Health     Financial Resource Strain: Not on file   Food Insecurity: Not on file   Transportation Needs: Not on file   Physical Activity: Not on file   Stress: Not on file   Social Connections: Not on file   Intimate Partner Violence: Not on file   Housing Stability: Not on file           Lab Results    Chemistry/lipid CBC Cardiac Enzymes/BNP/TSH/INR   Lab Results   Component Value Date    BUN 20 08/18/2022     08/18/2022    CO2 20 (L) 08/18/2022    Lab Results   Component Value Date    WBC 19.5 (H) 08/18/2022    HGB 17.2 (H) 08/18/2022    HCT 54.4 (H) 08/18/2022    MCV 97 08/18/2022     08/18/2022    Lab Results   Component Value Date    TROPONINI 0.88 (HH) 08/18/2022    BNP 1,016 (H) 08/18/2022    INR 1.13 08/18/2022     Lab Results   Component Value Date    TROPONINI 0.88 (HH) 08/18/2022          Weight:    Wt Readings from Last 3 Encounters:   08/18/22 75.1 kg (165 lb 8 oz)       Allergies  Allergies   Allergen Reactions     Penicillins Hives         Surgical History  History reviewed. No pertinent surgical history.    Social History  Tobacco:   History   Smoking Status     Former Smoker     Types: Cigarettes   Smokeless Tobacco     Never Used    Alcohol:   Social History    Substance and Sexual Activity      Alcohol use: Not Currently   Illicit Drugs:   History   Drug Use Unknown       Family History  History reviewed. No pertinent family history.       Kervin Salinas MD on 8/18/2022      cc: No primary care provider on file.,

## 2022-08-19 LAB
ABO/RH(D): NORMAL
ANION GAP SERPL CALCULATED.3IONS-SCNC: 12 MMOL/L (ref 5–18)
ANTIBODY SCREEN: NEGATIVE
BUN SERPL-MCNC: 18 MG/DL (ref 8–28)
CALCIUM SERPL-MCNC: 8.5 MG/DL (ref 8.5–10.5)
CHLORIDE BLD-SCNC: 106 MMOL/L (ref 98–107)
CO2 SERPL-SCNC: 23 MMOL/L (ref 22–31)
CREAT SERPL-MCNC: 0.8 MG/DL (ref 0.6–1.1)
ERYTHROCYTE [DISTWIDTH] IN BLOOD BY AUTOMATED COUNT: 14.6 % (ref 10–15)
GFR SERPL CREATININE-BSD FRML MDRD: 78 ML/MIN/1.73M2
GLUCOSE BLD-MCNC: 102 MG/DL (ref 70–125)
HCT VFR BLD AUTO: 46.5 % (ref 35–47)
HGB BLD-MCNC: 15.5 G/DL (ref 11.7–15.7)
MCH RBC QN AUTO: 30 PG (ref 26.5–33)
MCHC RBC AUTO-ENTMCNC: 33.3 G/DL (ref 31.5–36.5)
MCV RBC AUTO: 90 FL (ref 78–100)
PLATELET # BLD AUTO: 214 10E3/UL (ref 150–450)
POTASSIUM BLD-SCNC: 3 MMOL/L (ref 3.5–5)
POTASSIUM BLD-SCNC: 3.5 MMOL/L (ref 3.5–5)
RBC # BLD AUTO: 5.17 10E6/UL (ref 3.8–5.2)
SODIUM SERPL-SCNC: 141 MMOL/L (ref 136–145)
SPECIMEN EXPIRATION DATE: NORMAL
UFH PPP CHRO-ACNC: 0.14 IU/ML
UFH PPP CHRO-ACNC: 0.17 IU/ML
UFH PPP CHRO-ACNC: 0.71 IU/ML
WBC # BLD AUTO: 13.9 10E3/UL (ref 4–11)

## 2022-08-19 PROCEDURE — 86901 BLOOD TYPING SEROLOGIC RH(D): CPT | Performed by: NURSE PRACTITIONER

## 2022-08-19 PROCEDURE — C1894 INTRO/SHEATH, NON-LASER: HCPCS | Performed by: INTERNAL MEDICINE

## 2022-08-19 PROCEDURE — 250N000011 HC RX IP 250 OP 636: Performed by: INTERNAL MEDICINE

## 2022-08-19 PROCEDURE — B2111ZZ FLUOROSCOPY OF MULTIPLE CORONARY ARTERIES USING LOW OSMOLAR CONTRAST: ICD-10-PCS | Performed by: INTERNAL MEDICINE

## 2022-08-19 PROCEDURE — 250N000013 HC RX MED GY IP 250 OP 250 PS 637: Performed by: NURSE PRACTITIONER

## 2022-08-19 PROCEDURE — 250N000013 HC RX MED GY IP 250 OP 250 PS 637: Performed by: INTERNAL MEDICINE

## 2022-08-19 PROCEDURE — 4A023N7 MEASUREMENT OF CARDIAC SAMPLING AND PRESSURE, LEFT HEART, PERCUTANEOUS APPROACH: ICD-10-PCS | Performed by: INTERNAL MEDICINE

## 2022-08-19 PROCEDURE — 36415 COLL VENOUS BLD VENIPUNCTURE: CPT | Performed by: NURSE PRACTITIONER

## 2022-08-19 PROCEDURE — 36415 COLL VENOUS BLD VENIPUNCTURE: CPT | Performed by: INTERNAL MEDICINE

## 2022-08-19 PROCEDURE — 85014 HEMATOCRIT: CPT | Performed by: INTERNAL MEDICINE

## 2022-08-19 PROCEDURE — 250N000009 HC RX 250: Performed by: INTERNAL MEDICINE

## 2022-08-19 PROCEDURE — 272N000001 HC OR GENERAL SUPPLY STERILE: Performed by: INTERNAL MEDICINE

## 2022-08-19 PROCEDURE — 82310 ASSAY OF CALCIUM: CPT | Performed by: INTERNAL MEDICINE

## 2022-08-19 PROCEDURE — 99233 SBSQ HOSP IP/OBS HIGH 50: CPT | Performed by: INTERNAL MEDICINE

## 2022-08-19 PROCEDURE — C1769 GUIDE WIRE: HCPCS | Performed by: INTERNAL MEDICINE

## 2022-08-19 PROCEDURE — 258N000003 HC RX IP 258 OP 636: Performed by: INTERNAL MEDICINE

## 2022-08-19 PROCEDURE — 85520 HEPARIN ASSAY: CPT | Performed by: INTERNAL MEDICINE

## 2022-08-19 PROCEDURE — 94660 CPAP INITIATION&MGMT: CPT

## 2022-08-19 PROCEDURE — C9113 INJ PANTOPRAZOLE SODIUM, VIA: HCPCS | Performed by: INTERNAL MEDICINE

## 2022-08-19 PROCEDURE — 93005 ELECTROCARDIOGRAM TRACING: CPT

## 2022-08-19 PROCEDURE — 200N000001 HC R&B ICU

## 2022-08-19 PROCEDURE — 84132 ASSAY OF SERUM POTASSIUM: CPT | Performed by: INTERNAL MEDICINE

## 2022-08-19 PROCEDURE — 93458 L HRT ARTERY/VENTRICLE ANGIO: CPT | Performed by: INTERNAL MEDICINE

## 2022-08-19 PROCEDURE — 93458 L HRT ARTERY/VENTRICLE ANGIO: CPT | Mod: 26 | Performed by: INTERNAL MEDICINE

## 2022-08-19 PROCEDURE — 93010 ELECTROCARDIOGRAM REPORT: CPT | Mod: HIP | Performed by: INTERNAL MEDICINE

## 2022-08-19 PROCEDURE — 999N000157 HC STATISTIC RCP TIME EA 10 MIN

## 2022-08-19 RX ORDER — POTASSIUM CHLORIDE 1500 MG/1
20 TABLET, EXTENDED RELEASE ORAL ONCE
Status: COMPLETED | OUTPATIENT
Start: 2022-08-19 | End: 2022-08-19

## 2022-08-19 RX ORDER — NALOXONE HYDROCHLORIDE 0.4 MG/ML
0.4 INJECTION, SOLUTION INTRAMUSCULAR; INTRAVENOUS; SUBCUTANEOUS
Status: ACTIVE | OUTPATIENT
Start: 2022-08-19 | End: 2022-08-20

## 2022-08-19 RX ORDER — ACETAMINOPHEN 325 MG/1
650 TABLET ORAL EVERY 4 HOURS PRN
Status: DISCONTINUED | OUTPATIENT
Start: 2022-08-19 | End: 2022-08-22 | Stop reason: HOSPADM

## 2022-08-19 RX ORDER — ONDANSETRON 2 MG/ML
4 INJECTION INTRAMUSCULAR; INTRAVENOUS EVERY 6 HOURS PRN
Status: DISCONTINUED | OUTPATIENT
Start: 2022-08-19 | End: 2022-08-22 | Stop reason: HOSPADM

## 2022-08-19 RX ORDER — NALOXONE HYDROCHLORIDE 0.4 MG/ML
0.2 INJECTION, SOLUTION INTRAMUSCULAR; INTRAVENOUS; SUBCUTANEOUS
Status: ACTIVE | OUTPATIENT
Start: 2022-08-19 | End: 2022-08-20

## 2022-08-19 RX ORDER — HYDRALAZINE HYDROCHLORIDE 20 MG/ML
10 INJECTION INTRAMUSCULAR; INTRAVENOUS
Status: COMPLETED | OUTPATIENT
Start: 2022-08-19 | End: 2022-08-19

## 2022-08-19 RX ORDER — POTASSIUM CHLORIDE 1500 MG/1
40 TABLET, EXTENDED RELEASE ORAL ONCE
Status: COMPLETED | OUTPATIENT
Start: 2022-08-19 | End: 2022-08-19

## 2022-08-19 RX ORDER — SODIUM CHLORIDE 9 MG/ML
75 INJECTION, SOLUTION INTRAVENOUS CONTINUOUS
Status: ACTIVE | OUTPATIENT
Start: 2022-08-19 | End: 2022-08-19

## 2022-08-19 RX ORDER — DIAZEPAM 5 MG
5 TABLET ORAL ONCE
Status: COMPLETED | OUTPATIENT
Start: 2022-08-19 | End: 2022-08-19

## 2022-08-19 RX ORDER — ATROPINE SULFATE 0.1 MG/ML
0.5 INJECTION INTRAVENOUS
Status: ACTIVE | OUTPATIENT
Start: 2022-08-19 | End: 2022-08-20

## 2022-08-19 RX ORDER — ASPIRIN 325 MG
325 TABLET ORAL ONCE
Status: COMPLETED | OUTPATIENT
Start: 2022-08-19 | End: 2022-08-19

## 2022-08-19 RX ORDER — BUMETANIDE 2 MG/1
2 TABLET ORAL DAILY
Status: DISCONTINUED | OUTPATIENT
Start: 2022-08-20 | End: 2022-08-22 | Stop reason: HOSPADM

## 2022-08-19 RX ORDER — FLUMAZENIL 0.1 MG/ML
0.2 INJECTION, SOLUTION INTRAVENOUS
Status: ACTIVE | OUTPATIENT
Start: 2022-08-19 | End: 2022-08-20

## 2022-08-19 RX ORDER — NAPROXEN SODIUM 220 MG
220 TABLET ORAL 2 TIMES DAILY PRN
Status: DISCONTINUED | OUTPATIENT
Start: 2022-08-19 | End: 2022-08-22 | Stop reason: HOSPADM

## 2022-08-19 RX ADMIN — POTASSIUM CHLORIDE 20 MEQ: 1500 TABLET, EXTENDED RELEASE ORAL at 13:18

## 2022-08-19 RX ADMIN — PANTOPRAZOLE SODIUM 40 MG: 40 INJECTION, POWDER, FOR SOLUTION INTRAVENOUS at 08:04

## 2022-08-19 RX ADMIN — POTASSIUM CHLORIDE 40 MEQ: 1500 TABLET, EXTENDED RELEASE ORAL at 10:48

## 2022-08-19 RX ADMIN — DIAZEPAM 5 MG: 5 TABLET ORAL at 14:44

## 2022-08-19 RX ADMIN — HYDRALAZINE HYDROCHLORIDE 10 MG: 20 INJECTION, SOLUTION INTRAMUSCULAR; INTRAVENOUS at 21:09

## 2022-08-19 RX ADMIN — SODIUM CHLORIDE 75 ML/HR: 9 INJECTION, SOLUTION INTRAVENOUS at 16:40

## 2022-08-19 RX ADMIN — POTASSIUM CHLORIDE 20 MEQ: 1500 TABLET, EXTENDED RELEASE ORAL at 14:44

## 2022-08-19 RX ADMIN — ASPIRIN 325 MG: 325 TABLET ORAL at 08:04

## 2022-08-19 RX ADMIN — LEVOFLOXACIN 750 MG: 5 INJECTION, SOLUTION INTRAVENOUS at 08:04

## 2022-08-19 RX ADMIN — HEPARIN SODIUM AND DEXTROSE 750 UNITS/HR: 10000; 5 INJECTION INTRAVENOUS at 14:44

## 2022-08-19 RX ADMIN — FUROSEMIDE 10 MG/HR: 10 INJECTION, SOLUTION INTRAVENOUS at 00:19

## 2022-08-19 ASSESSMENT — ACTIVITIES OF DAILY LIVING (ADL)
ADLS_ACUITY_SCORE: 28
ADLS_ACUITY_SCORE: 21
ADLS_ACUITY_SCORE: 28
ADLS_ACUITY_SCORE: 21
ADLS_ACUITY_SCORE: 28
ADLS_ACUITY_SCORE: 21
ADLS_ACUITY_SCORE: 28
ADLS_ACUITY_SCORE: 28

## 2022-08-19 ASSESSMENT — EJECTION FRACTION: EF_VALUE: .19

## 2022-08-19 NOTE — PLAN OF CARE
Goal Outcome Evaluation:           [facility] - ICU    RN Progress Note:            Pertinent Assessments:      Please refer to flowsheet rows for full assessment     Heparin infusion running, following protocol for titration changes.         Mobility Level:     2            Key Events - This Shift:     Lasix infusion off.              Plan:     Angio this evening.     Problem: Plan of Care - These are the overarching goals to be used throughout the patient stay.    Goal: Plan of Care Review/Shift Note  Description: The Plan of Care Review/Shift note should be completed every shift.  The Outcome Evaluation is a brief statement about your assessment that the patient is improving, declining, or no change.  This information will be displayed automatically on your shift note.  Outcome: Ongoing, Progressing  Goal: Absence of Hospital-Acquired Illness or Injury  Intervention: Identify and Manage Fall Risk  Recent Flowsheet Documentation  Taken 8/19/2022 0800 by Keshav Alas, RN  Safety Promotion/Fall Prevention:   clutter free environment maintained   fall prevention program maintained   nonskid shoes/slippers when out of bed   patient and family education  Intervention: Prevent Skin Injury  Recent Flowsheet Documentation  Taken 8/19/2022 0800 by Keshav Alas, RN  Body Position: position changed independently  Intervention: Prevent Infection  Recent Flowsheet Documentation  Taken 8/19/2022 0800 by Keshav Alas, RN  Infection Prevention:   hand hygiene promoted   rest/sleep promoted  Goal: Optimal Comfort and Wellbeing  Intervention: Provide Person-Centered Care  Recent Flowsheet Documentation  Taken 8/19/2022 0800 by Keshav Alas, RN  Trust Relationship/Rapport: care explained

## 2022-08-19 NOTE — PLAN OF CARE
Goal Outcome Evaluation:    [facility] - ICU    RN Progress Note:            Pertinent Assessments:      Please refer to flowsheet rows for full assessment     Pt is alert and oriented x4, follows commands. Denies CP during this shift. Pt denies SOB, on 2L NC sats in the mid 90s.         Mobility Level:     Bedrest with assist of 2         Key Events - This Shift:     Trop=5.09, trending up,new order received for heparin gtt @ 900 units/hr. Xa recheck at midnight. K=3.7,  replaced, recheck am.              Plan:     Monitor labs, diurese, and monitor for signs or symptoms of respiratory distress and CP.         Point of Contact Update YES-OR-NO: Yes  If No, reason:   Name:Carson   Phone Number:See facesheet  Summary of Conversation: POC.

## 2022-08-19 NOTE — PLAN OF CARE
"Goal Outcome Evaluation:      Pt alert and oriented x4, VSS, tele SR, denies pain, on heparin and lasix drip, good urine output, heparin xa supra therapeutic, held for 1hr and resumed at 600unit/hr, next xa at 0745, pt for angio today per report, kept npo past midnight, will continue to monitor.  Problem: Plan of Care - These are the overarching goals to be used throughout the patient stay.    Goal: Plan of Care Review/Shift Note  Description: The Plan of Care Review/Shift note should be completed every shift.  The Outcome Evaluation is a brief statement about your assessment that the patient is improving, declining, or no change.  This information will be displayed automatically on your shift note.  Outcome: Ongoing, Progressing  Goal: Patient-Specific Goal (Individualized)  Description: You can add care plan individualizations to a care plan. Examples of Individualization might be:  \"Parent requests to be called daily at 9am for status\", \"I have a hard time hearing out of my right ear\", or \"Do not touch me to wake me up as it startles me\".  Outcome: Ongoing, Progressing  Goal: Absence of Hospital-Acquired Illness or Injury  Outcome: Ongoing, Progressing  Intervention: Identify and Manage Fall Risk  Recent Flowsheet Documentation  Taken 8/18/2022 2356 by Irving Emerson RN  Safety Promotion/Fall Prevention:   clutter free environment maintained   fall prevention program maintained   nonskid shoes/slippers when out of bed   patient and family education  Intervention: Prevent Skin Injury  Recent Flowsheet Documentation  Taken 8/18/2022 2356 by Irving Emerson, RN  Body Position: position changed independently  Intervention: Prevent Infection  Recent Flowsheet Documentation  Taken 8/18/2022 2356 by Irving Emerson, RN  Infection Prevention:   hand hygiene promoted   rest/sleep promoted  Goal: Optimal Comfort and Wellbeing  Outcome: Ongoing, Progressing  Intervention: Provide Person-Centered Care  Recent Flowsheet " Documentation  Taken 8/18/2022 4536 by Irving Emerson, RN  Trust Relationship/Rapport: care explained  Goal: Readiness for Transition of Care  Outcome: Ongoing, Progressing

## 2022-08-19 NOTE — PROGRESS NOTES
"Critical Care Admission Note      08/19/2022    Name: Radha Hansen MRN#: [id number]   Age: 72 year old YOB: 1950                    Problem List:   Active Problems:    Pulmonary edema  Hypoxic respiratory failure  Chest pain  Leukocytosis  Possible pneumonia    Clinically Significant Risk Factors Present on Admission                # Overweight: Estimated body mass index is 25.16 kg/m  as calculated from the following:    Height as of this encounter: 1.727 m (5' 8\").    Weight as of this encounter: 75.1 kg (165 lb 8 oz).        Will need to clarify CODE STATUS.  Intermittently she says she does not want to be intubated but will wait for her to wake up from her nap and now that she is feeling better see if she truly wants to be DNR/DNI or not.  Keep full code for now until I can have a discussion with her this afternoon.        HPI:   70-year-old female with no significant past medical history.  Patient states that she is occasionally awakened by retrosternal chest pain that goes away after a while.  Has not noticed chest pain with activity.  Today at 3 AM she was awakened with significant retrosternal pain, pressure in character, and difficult to breathe.  Brought to the emergency room by paramedics.  Placed on BiPAP and started on nitroglycerin with improvement.  Chest x-ray with pulmonary edema.    Initial EKG with ST elevations in anterior leads that seem to be improving        Assessment and plan :       I have personally reviewed the labs, imaging studies, cultures and discussed the case with referring physician and consulting physicians.     My assessment and plan by system for this patient is as follows:    Neurology/Psychiatry:   Awake alert and oriented.  No issues      Cardiovascular:   Admitted with chest pain, pulmonary edema and now with non-ST elevation MI  EF of 15 to 20%.    On a heparin drip and Lasix drip.  Plan for cath later on today      Pulmonary/Ventilator Management:   Acute " pulmonary edema resulting in acute hypoxic respiratory failure requiring BiPAP  Cannot rule out pneumonia and with leukocytosis we will cover her with levofloxacin for now    Came off BiPAP after diuresis.  Incidental nodules on CT. since she is a former smoker I think this would warrant follow-up at 12 months.    GI and Nutrition :   N.p.o. for cardiac cath      Renal/Fluids/Electrolytes:   Diuresis with Lasix drip    - monitor function and electrolytes as needed with replacement per ICU protocols. - generally avoid nephrotoxic agents such as NSAID, IV contrast unless specifically required  - adjust medications as needed for renal clearance  - follow I/O's as appropriate.    Infectious Disease:   Started on levofloxacin since superimposed pulmonary infection could not be ruled out.      Endocrine:     - ICU insulin protocol, goal sugar <180      ICU Prophylaxis:   1. DVT: Lovenox  3. Stress Ulcer: PPI           Medical History:     Past Medical History:   Diagnosis Date     Hypertension      History reviewed. No pertinent surgical history.  Social History     Socioeconomic History     Marital status: Single     Spouse name: Not on file     Number of children: Not on file     Years of education: Not on file     Highest education level: Not on file   Occupational History     Not on file   Tobacco Use     Smoking status: Former Smoker     Types: Cigarettes     Smokeless tobacco: Never Used   Substance and Sexual Activity     Alcohol use: Not Currently     Drug use: Not Currently     Sexual activity: Not Currently   Other Topics Concern     Not on file   Social History Narrative     Not on file     Social Determinants of Health     Financial Resource Strain: Not on file   Food Insecurity: Not on file   Transportation Needs: Not on file   Physical Activity: Not on file   Stress: Not on file   Social Connections: Not on file   Intimate Partner Violence: Not on file   Housing Stability: Not on file        Allergies    Allergen Reactions     Penicillins Hives     Childhood reaction ~5-6 years old but reports accidentally prescribed penicillin recently and tolerated so think she may have grown out of the allergy               Key Medications:       [START ON 8/20/2022] bumetanide  2 mg Oral Daily     diazepam  5 mg Oral Once     levofloxacin  750 mg Intravenous Q24H     pantoprazole (PROTONIX) IV  40 mg Intravenous Daily with breakfast     potassium chloride  20 mEq Oral Once       heparin 750 Units/hr (08/19/22 0811)     nitroGLYcerin Stopped (08/18/22 0945)        Home Meds  No current facility-administered medications on file prior to encounter.  naproxen sodium (ANAPROX) 220 MG tablet, Take 220 mg by mouth 2 times daily as needed for moderate pain  VITAMIN D-VITAMIN K PO, Take 1 capsule by mouth daily Dr. Dubons brand. Contains vitamin D3 10,000 units, vitamin K2 100mcg, zinc 20mg, & magnesium 25mg               Physical Examination:   Temp:  [98  F (36.7  C)-99  F (37.2  C)] 98.9  F (37.2  C)  Pulse:  [81-99] 93  Resp:  [15-35] 15  BP: (110-154)/(53-81) 134/62  SpO2:  [92 %-97 %] 96 %  No intake or output data in the 24 hours ending 08/18/22 0906  Wt Readings from Last 4 Encounters:   08/18/22 75.1 kg (165 lb 8 oz)     BP - Mean:  [] 89  FiO2 (%): 70 %  Resp: 15    No lab results found in last 7 days.    GEN: no acute distress   HEENT: head ncat, sclera anicteric, OP patent, trachea midline   PULM: Clear bilaterally  CV/COR: RRR S1S2 no gallop,  No rub, no murmur  ABD: soft nontender, hypoactive bowel sounds, no mass  EXT: No significant edema  NEURO: grossly intact  SKIN: no obvious rash  LINES: clean, dry intact         Data:   All data and imaging reviewed     ROUTINE ICU LABS (Last four results)  CMP  Recent Labs   Lab 08/19/22  0742 08/18/22 2026 08/18/22  1600 08/18/22  1509 08/18/22  1249 08/18/22  0928 08/18/22  0550     --   --   --   --   --  140   POTASSIUM 3.0*  --   --  3.7  --   --  3.9   CHLORIDE  106  --   --   --   --   --  108*   CO2 23  --   --   --   --   --  20*   ANIONGAP 12  --   --   --   --   --  12    96 122*  --  107*  --  376*   BUN 18  --   --   --   --   --  20   CR 0.80  --   --   --   --  0.88 1.18*   GFRESTIMATED 78  --   --   --   --  69 49*   JENNIFFER 8.5  --   --   --   --   --  8.7   MAG  --   --   --  1.9  --  2.2  --    PROTTOTAL  --   --   --   --   --   --  6.5   ALBUMIN  --   --   --   --   --   --  3.4*   BILITOTAL  --   --   --   --   --   --  0.6   ALKPHOS  --   --   --   --   --   --  112   AST  --   --   --   --   --   --  52*   ALT  --   --   --   --   --   --  43     CBC  Recent Labs   Lab 08/19/22  0742 08/18/22  1821 08/18/22  0550   WBC 13.9* 13.9* 19.5*   RBC 5.17 5.36* 5.64*   HGB 15.5 16.3* 17.2*   HCT 46.5 48.7* 54.4*   MCV 90 91 97   MCH 30.0 30.4 30.5   MCHC 33.3 33.5 31.6   RDW 14.6 14.5 14.3    239 294     INR  Recent Labs   Lab 08/18/22  0550   INR 1.13     Arterial Blood GasNo lab results found in last 7 days.    All cultures:  No results for input(s): CULT in the last 168 hours.  Recent Results (from the past 24 hour(s))   XR Chest Port 1 View    Narrative    EXAM: XR CHEST PORT 1 VIEW  LOCATION: [facility]  DATE/TIME: 8/18/2022 5:34 AM    INDICATION: STEMI  COMPARISON: None.      Impression    IMPRESSION: Increased interstitial lung markings with hazy bibasilar consolidations. Findings are most likely reflecting of pulmonary edema. No pneumothorax or large pleural effusion. The cardiomediastinal silhouette is within normal limits.   CT Chest Pulmonary Embolism w Contrast    Narrative    EXAM: CT CHEST PULMONARY EMBOLISM W CONTRAST  LOCATION: [facility]  DATE/TIME: 8/18/2022 7:49 AM    INDICATION: elevated ddimer, chest pain, shortness of breath  COMPARISON: None.  TECHNIQUE: CT chest pulmonary angiogram during arterial phase injection of IV contrast. Multiplanar reformats and MIP reconstructions were  performed. Dose reduction techniques were used.   CONTRAST: Isovue 370     75ml    FINDINGS:  ANGIOGRAM CHEST: No pulmonary artery filling defects. The aorta is normal in caliber.    LUNGS AND PLEURA: No edema. Small bilateral effusions and associated atelectasis. Scattered groundglass opacities and interlobular septal thickening. Mild bronchial wall thickening. No pneumothorax. There are a few scattered pulmonary nodules with the   largest in the right middle lobe measuring 5 mm on series 6 image 168    MEDIASTINUM/AXILLAE: Cardiomegaly with left ventricular hypertrophy. No adenopathy.    CORONARY ARTERY CALCIFICATION: Mild.    UPPER ABDOMEN: No significant finding.    MUSCULOSKELETAL: T5 vertebral body hemangioma. Degenerative changes of the spine.      Impression    IMPRESSION:  1.  No pulmonary embolus.  2.  Cardiomegaly and pulmonary edema. Superimposed infection is also the differential.  3.  Small bilateral effusions.  4.  Scattered pulmonary micronodules measuring up to 5 mm. Follow-up guidelines as below.    REFERENCE:  Guidelines for Management of Incidental Pulmonary Nodules Detected on CT Images: From the Fleischner Society 2017.   Guidelines apply to incidental nodules in patients who are 35 years or older.  Guidelines do not apply to lung cancer screening, patients with immunosuppression, or patients with known primary cancer.    MULTIPLE NODULES  Nodule size <6 mm  Low-risk patients: No follow-up needed.  High-risk patients: Optional follow-up at 12 months.    Consider referral to lung nodule clinic.         30 minutes spent with the patient

## 2022-08-19 NOTE — PROGRESS NOTES
"Care Management Note    Length of Stay (days): 1    Expected Discharge Date:   To be determined.     Concerns to be Addressed:   Alteration in cardiopulmonary status requiring IV Lasix drip. Levaquin IV. Supplemental oxygen.  Patient plan of care discussed at interdisciplinary rounds: Yes  Follow up from rounds/notes:   Downgrade from ICU today likely.    Anticipated Discharge Disposition:  Discharge goal is home.      Anticipated Discharge Services:  None anticipated.   Anticipated Discharge DME:  To be determined.     Patient/family educated on Medicare website which has current facility and service quality ratings:  NA  Education Provided on the Discharge Plan:  Per team  Patient/Family in Agreement with the Plan:  yes    Referrals Placed by CM/SW:  None  Private pay costs discussed: Not applicable     Additional Information:  Initially, patient's address was listed as \"[employer] [facility] but patient clarified that she works at [employer] and lives at [address] [place] [address] [address] in [place] ([id number]). Confirmed her phone number and updated face sheet. She prefers her friends Valencia and Micah Walton act as her emergency contacts: [phone].  Her goal is to return home at discharge but care management will follow along. Of note, patient has no PCP in the community that she sees. Resources for care connection placed on patient's AVS.     Lesly Guadarrama RN  "

## 2022-08-19 NOTE — PROGRESS NOTES
Pt stayed off BIPAP all NOC. Rt to see about DC'ing order in rounds this am.    Mc Morales, RT on 8/19/2022 at 6:07 AM

## 2022-08-19 NOTE — PROGRESS NOTES
HEART CARE NOTE          Assessment/Recommendations     1. HFrEF c/b ADHF/cardiogenic pulmonary edema  Assessment / Plan    Diuresing well on furosemide gtt; near euvolemia on physical exam - will transition to oral diuretic regimen and continue to monitor    Echo significant for severely decreased LVSF which appears new and WMAs; troponin trending up overnight - started on heparin gtt - will proceed with coronary angiogram +/- PCI. I discussed this with him including potential risk of stroke, myocardial infarction, or death.  We also discussed the possibility of vascular injury, bleeding requiring blood transfusion, or reaction to x-ray dy      2. NSTEMI  Assessment / Plan    Troponin continues to trend-up; continue heparin gtt    Coronary angiogram as detailed above      History of Present Illness/Subjective      Ms. Radha Hansen is a 72 year old female with no significant past cardiac history who presents in cardiogenic pulmonary.     Today, Mrs. Hansen denies any acute cardiac events or complaints; reports that presenting symptoms are significantly improved; Management plan as detailed above.       ECG: Personally reviewed.Sinus tachycardia     ECHO:   The left atrium is mildly dilated.  There is borderline concentric left ventricular hypertrophy.  The visual ejection fraction is 15-20%.  Diastolic Doppler findings (E/E' ratio and/or other parameters) suggest left  ventricular filling pressures are increased.  There is anterior, septal, and apical wall akinesis.  There is inferior wall akinesis.  Mildly decreased right ventricular systolic function  No significant valve abnormality.     CT chest:  IMPRESSION:  1.  No pulmonary embolus.  2.  Cardiomegaly and pulmonary edema. Superimposed infection is also the differential.  3.  Small bilateral effusions.  4.  Scattered pulmonary micronodules measuring up to 5 mm. Follow-up guidelines as below.          Physical Examination Review of Systems   /58   Pulse  "81   Temp 99  F (37.2  C) (Oral)   Resp 17   Ht 1.727 m (5' 8\")   Wt 75.1 kg (165 lb 8 oz)   SpO2 97%   BMI 25.16 kg/m    Body mass index is 25.16 kg/m .  Wt Readings from Last 3 Encounters:   08/18/22 75.1 kg (165 lb 8 oz)     General Appearance:   no distress, normal body habitus   ENT/Mouth: membranes moist, no oral lesions or bleeding gums.      EYES:  no scleral icterus, normal conjunctivae   Neck: no carotid bruits or thyromegaly   Chest/Lungs:   lungs are clear to auscultation, no rales or wheezing, equal chest wall expansion    Cardiovascular:   Regular. Normal first and second heart sounds with no murmurs, rubs, or gallops; the carotid, radial and posterior tibial pulses are intact, no JVD or LE edema bilaterally    Abdomen:  no organomegaly, masses, bruits, or tenderness; bowel sounds are present   Extremities: no cyanosis or clubbing   Skin: no xanthelasma, warm.    Neurologic: alert and oriented x3, calm     Psychiatric: alert and oriented x3, calm     A complete 10 systems ROS was reviewed  And is negative except what is listed in the HPI.          Medical History  Surgical History Family History Social History   Past Medical History:   Diagnosis Date     Hypertension     History reviewed. No pertinent surgical history. no family history of premature coronary artery disease Social History     Socioeconomic History     Marital status: Single     Spouse name: Not on file     Number of children: Not on file     Years of education: Not on file     Highest education level: Not on file   Occupational History     Not on file   Tobacco Use     Smoking status: Former Smoker     Types: Cigarettes     Smokeless tobacco: Never Used   Substance and Sexual Activity     Alcohol use: Not Currently     Drug use: Not Currently     Sexual activity: Not Currently   Other Topics Concern     Not on file   Social History Narrative     Not on file     Social Determinants of Health     Financial Resource Strain: Not on " file   Food Insecurity: Not on file   Transportation Needs: Not on file   Physical Activity: Not on file   Stress: Not on file   Social Connections: Not on file   Intimate Partner Violence: Not on file   Housing Stability: Not on file           Lab Results    Chemistry/lipid CBC Cardiac Enzymes/BNP/TSH/INR   Lab Results   Component Value Date    BUN 20 08/18/2022     08/18/2022    CO2 20 (L) 08/18/2022    Lab Results   Component Value Date    WBC 13.9 (H) 08/18/2022    HGB 16.3 (H) 08/18/2022    HCT 48.7 (H) 08/18/2022    MCV 91 08/18/2022     08/18/2022    Lab Results   Component Value Date    TROPONINI 5.09 (HH) 08/18/2022    BNP 1,016 (H) 08/18/2022    INR 1.13 08/18/2022     Lab Results   Component Value Date    TROPONINI 5.09 (HH) 08/18/2022          Weight:    Wt Readings from Last 3 Encounters:   08/18/22 75.1 kg (165 lb 8 oz)       Allergies  Allergies   Allergen Reactions     Penicillins Hives     Childhood reaction ~5-6 years old but reports accidentally prescribed penicillin recently and tolerated so think she may have grown out of the allergy          Surgical History  History reviewed. No pertinent surgical history.    Social History  Tobacco:   History   Smoking Status     Former Smoker     Types: Cigarettes   Smokeless Tobacco     Never Used    Alcohol:   Social History    Substance and Sexual Activity      Alcohol use: Not Currently   Illicit Drugs:   History   Drug Use Unknown       Family History  History reviewed. No pertinent family history.       Kervin Salinas MD on 8/19/2022      cc: No primary care provider on file.

## 2022-08-19 NOTE — PRE-PROCEDURE
GENERAL PRE-PROCEDURE:   Procedure:  Coronary angiogram with possible PCI  Date/Time:  8/19/2022 9:40 AM    Written consent obtained?: Yes    Risks and benefits: Risks, benefits and alternatives were discussed    Consent given by:  Patient  Patient states understanding of procedure being performed: Yes    Patient's understanding of procedure matches consent: Yes    Procedure consent matches procedure scheduled: Yes    Expected level of sedation:  Moderate  Appropriately NPO:  Yes  ASA Class:  4 (NSTEMI, HFrEF, HTN)  Mallampati  :  Grade 3- soft palate visible, posterior pharyngeal wall not visible  Lungs:  Lungs clear with good breath sounds bilaterally  Heart:  Normal heart sounds and rate  History & Physical reviewed:  History and physical reviewed and no updates needed  Statement of review:  I have reviewed the lab findings, diagnostic data, medications, and the plan for sedation

## 2022-08-19 NOTE — PROVIDER NOTIFICATION
"Paged Dr. Chisholm @ 1402, \"FYI elevated Trop 5.09. Pt denies CP, and heparin gtt is infusing @ 900 units/hr Please advise.''    Per Dr Chisholm advised writer to continue to monitor, no new order receive. Robert Mann RN on 8/18/2022 at 10:30 PM      "

## 2022-08-19 NOTE — PROGRESS NOTES
Progress Note  Patient admitted to the intensivist service for acute CHF exacerbation.  Also with non-ST elevation with high troponin.  On IV heparin.  Cardiology managing.  Intensivist transferred to medical service today      Assessment/Plan  Non-ST elevation MI, elevated troponin, on IV heparin, plan coronary angiogram today, cardiology managing, other cardiac meds per cardiology    Heart failure preserved ejection fraction, acute pulmonary edema, required BiPAP but now on oxygen, continue Lasix IV per cardiology, echo showed LVEF 15 to 20%    Probable pneumonia with leukocytosis, continue empiric IV antibiotics for now    DVT PPX IV heparin    Barriers to discharge non-STEMI evaluation    Anticipated Discharge date inpatient multiple days        Subjective  Patient seen by intensivist today please see notes further further details  No events reported overnight    Objective  Vital signs in last 24 hours  Temp:  [98.3  F (36.8  C)-99  F (37.2  C)] 98.3  F (36.8  C)  Pulse:  [80-99] 89  Resp:  [15-35] 15  BP: (114-158)/(53-74) 147/63  SpO2:  [93 %-98 %] 96 %    Input and Output in 24 hrs     Intake/Output Summary (Last 24 hours) at 8/19/2022 1655  Last data filed at 8/19/2022 1530  Gross per 24 hour   Intake 435.3 ml   Output 3325 ml   Net -2889.7 ml           Recent Results (from the past 24 hour(s))   CBC with platelets    Collection Time: 08/18/22  6:21 PM   Result Value Ref Range    WBC Count 13.9 (H) 4.0 - 11.0 10e3/uL    RBC Count 5.36 (H) 3.80 - 5.20 10e6/uL    Hemoglobin 16.3 (H) 11.7 - 15.7 g/dL    Hematocrit 48.7 (H) 35.0 - 47.0 %    MCV 91 78 - 100 fL    MCH 30.4 26.5 - 33.0 pg    MCHC 33.5 31.5 - 36.5 g/dL    RDW 14.5 10.0 - 15.0 %    Platelet Count 239 150 - 450 10e3/uL   Glucose by meter    Collection Time: 08/18/22  8:26 PM   Result Value Ref Range    GLUCOSE BY METER POCT 96 70 - 99 mg/dL   Troponin I    Collection Time: 08/18/22  9:32 PM   Result Value Ref Range    Troponin I 5.09 (HH) 0.00 - 0.29  ng/mL   Heparin Unfractionated Anti Xa Level    Collection Time: 08/18/22 11:53 PM   Result Value Ref Range    Anti Xa Unfractionated Heparin 0.71 For Reference Range, See Comment IU/mL   Adult Type and Screen    Collection Time: 08/19/22  7:41 AM   Result Value Ref Range    ABO/RH(D) A POS     Antibody Screen Negative Negative    SPECIMEN EXPIRATION DATE [id number]    Basic metabolic panel    Collection Time: 08/19/22  7:42 AM   Result Value Ref Range    Sodium 141 136 - 145 mmol/L    Potassium 3.0 (L) 3.5 - 5.0 mmol/L    Chloride 106 98 - 107 mmol/L    Carbon Dioxide (CO2) 23 22 - 31 mmol/L    Anion Gap 12 5 - 18 mmol/L    Urea Nitrogen 18 8 - 28 mg/dL    Creatinine 0.80 0.60 - 1.10 mg/dL    Calcium 8.5 8.5 - 10.5 mg/dL    Glucose 102 70 - 125 mg/dL    GFR Estimate 78 >60 mL/min/1.73m2   CBC with platelets    Collection Time: 08/19/22  7:42 AM   Result Value Ref Range    WBC Count 13.9 (H) 4.0 - 11.0 10e3/uL    RBC Count 5.17 3.80 - 5.20 10e6/uL    Hemoglobin 15.5 11.7 - 15.7 g/dL    Hematocrit 46.5 35.0 - 47.0 %    MCV 90 78 - 100 fL    MCH 30.0 26.5 - 33.0 pg    MCHC 33.3 31.5 - 36.5 g/dL    RDW 14.6 10.0 - 15.0 %    Platelet Count 214 150 - 450 10e3/uL   Heparin Unfractionated Anti Xa Level    Collection Time: 08/19/22  7:42 AM   Result Value Ref Range    Anti Xa Unfractionated Heparin 0.14 For Reference Range, See Comment IU/mL   Heparin Unfractionated Anti Xa Level    Collection Time: 08/19/22  1:50 PM   Result Value Ref Range    Anti Xa Unfractionated Heparin 0.17 For Reference Range, See Comment IU/mL   Potassium    Collection Time: 08/19/22  1:50 PM   Result Value Ref Range    Potassium 3.5 3.5 - 5.0 mmol/L       EKG Results reviewed       Advanced Care Planning      Joao Vigil MD MAmandaD

## 2022-08-20 ENCOUNTER — APPOINTMENT (OUTPATIENT)
Dept: MRI IMAGING | Facility: HOSPITAL | Age: 72
DRG: 280 | End: 2022-08-20
Attending: INTERNAL MEDICINE
Payer: MEDICARE

## 2022-08-20 ENCOUNTER — APPOINTMENT (OUTPATIENT)
Dept: RADIOLOGY | Facility: HOSPITAL | Age: 72
DRG: 280 | End: 2022-08-20
Attending: INTERNAL MEDICINE
Payer: MEDICARE

## 2022-08-20 LAB
ATRIAL RATE - MUSE: 113 BPM
DIASTOLIC BLOOD PRESSURE - MUSE: NORMAL MMHG
HOLD SPECIMEN: NORMAL
INTERPRETATION ECG - MUSE: NORMAL
P AXIS - MUSE: 57 DEGREES
POTASSIUM BLD-SCNC: 3.7 MMOL/L (ref 3.5–5)
PR INTERVAL - MUSE: 136 MS
QRS DURATION - MUSE: 80 MS
QT - MUSE: 360 MS
QTC - MUSE: 493 MS
R AXIS - MUSE: -15 DEGREES
SYSTOLIC BLOOD PRESSURE - MUSE: NORMAL MMHG
T AXIS - MUSE: 75 DEGREES
VENTRICULAR RATE- MUSE: 113 BPM

## 2022-08-20 PROCEDURE — 200N000001 HC R&B ICU

## 2022-08-20 PROCEDURE — 84132 ASSAY OF SERUM POTASSIUM: CPT | Performed by: INTERNAL MEDICINE

## 2022-08-20 PROCEDURE — 250N000013 HC RX MED GY IP 250 OP 250 PS 637: Performed by: INTERNAL MEDICINE

## 2022-08-20 PROCEDURE — A9585 GADOBUTROL INJECTION: HCPCS | Performed by: INTERNAL MEDICINE

## 2022-08-20 PROCEDURE — 99233 SBSQ HOSP IP/OBS HIGH 50: CPT | Performed by: HOSPITALIST

## 2022-08-20 PROCEDURE — G1010 CDSM STANSON: HCPCS | Performed by: GENERAL ACUTE CARE HOSPITAL

## 2022-08-20 PROCEDURE — 255N000002 HC RX 255 OP 636: Performed by: INTERNAL MEDICINE

## 2022-08-20 PROCEDURE — 74019 RADEX ABDOMEN 2 VIEWS: CPT

## 2022-08-20 PROCEDURE — 250N000011 HC RX IP 250 OP 636: Performed by: INTERNAL MEDICINE

## 2022-08-20 PROCEDURE — 75561 CARDIAC MRI FOR MORPH W/DYE: CPT | Mod: 26 | Performed by: GENERAL ACUTE CARE HOSPITAL

## 2022-08-20 PROCEDURE — 36415 COLL VENOUS BLD VENIPUNCTURE: CPT | Performed by: INTERNAL MEDICINE

## 2022-08-20 PROCEDURE — 999N000122 MR MYOCARDIUM  OVERREAD

## 2022-08-20 PROCEDURE — 99233 SBSQ HOSP IP/OBS HIGH 50: CPT | Performed by: INTERNAL MEDICINE

## 2022-08-20 PROCEDURE — G1010 CDSM STANSON: HCPCS

## 2022-08-20 PROCEDURE — C9113 INJ PANTOPRAZOLE SODIUM, VIA: HCPCS | Performed by: INTERNAL MEDICINE

## 2022-08-20 RX ORDER — PANTOPRAZOLE SODIUM 40 MG/1
40 TABLET, DELAYED RELEASE ORAL
Status: DISCONTINUED | OUTPATIENT
Start: 2022-08-21 | End: 2022-08-22 | Stop reason: HOSPADM

## 2022-08-20 RX ORDER — LEVOFLOXACIN 750 MG/1
750 TABLET, FILM COATED ORAL DAILY
Status: DISCONTINUED | OUTPATIENT
Start: 2022-08-21 | End: 2022-08-22 | Stop reason: HOSPADM

## 2022-08-20 RX ORDER — GADOBUTROL 604.72 MG/ML
13 INJECTION INTRAVENOUS ONCE
Status: COMPLETED | OUTPATIENT
Start: 2022-08-20 | End: 2022-08-20

## 2022-08-20 RX ORDER — POTASSIUM CHLORIDE 1500 MG/1
20 TABLET, EXTENDED RELEASE ORAL ONCE
Status: COMPLETED | OUTPATIENT
Start: 2022-08-20 | End: 2022-08-20

## 2022-08-20 RX ADMIN — PANTOPRAZOLE SODIUM 40 MG: 40 INJECTION, POWDER, FOR SOLUTION INTRAVENOUS at 07:37

## 2022-08-20 RX ADMIN — POTASSIUM CHLORIDE 20 MEQ: 1500 TABLET, EXTENDED RELEASE ORAL at 07:36

## 2022-08-20 RX ADMIN — GADOBUTROL 13 ML: 604.72 INJECTION INTRAVENOUS at 14:11

## 2022-08-20 RX ADMIN — LEVOFLOXACIN 750 MG: 5 INJECTION, SOLUTION INTRAVENOUS at 07:37

## 2022-08-20 ASSESSMENT — ACTIVITIES OF DAILY LIVING (ADL)
ADLS_ACUITY_SCORE: 28

## 2022-08-20 NOTE — PROGRESS NOTES
"  HEART CARE NOTE          Assessment/Recommendations     1. HFrEF c/b ADHF/cardiogenic pulmonary edema  Assessment / Plan    Tolerating oral diuretic regimen; continue to monitor    Echo significant for severely decreased LVSF which appears new and WMAs - coronary angio remarkable for  of LAD - CMRI viability study ordered        2. NSTEMI  Assessment / Plan    Troponin continues to trend-up; S/p coronary angiogram significant for  of LAD - will proceed with viability study      History of Present Illness/Subjective      Ms. Radha Hansen is a 72 year old female with no significant past cardiac history who presents in cardiogenic pulmonary.     Today, Mrs. Hansen denies any acute cardiac events or complaints; Management plan as detailed above.        ECG: Personally reviewed.Sinus tachycardia     ECHO:   The left atrium is mildly dilated.  There is borderline concentric left ventricular hypertrophy.  The visual ejection fraction is 15-20%.  Diastolic Doppler findings (E/E' ratio and/or other parameters) suggest left  ventricular filling pressures are increased.  There is anterior, septal, and apical wall akinesis.  There is inferior wall akinesis.  Mildly decreased right ventricular systolic function  No significant valve abnormality.     CT chest:  IMPRESSION:  1.  No pulmonary embolus.  2.  Cardiomegaly and pulmonary edema. Superimposed infection is also the differential.  3.  Small bilateral effusions.  4.  Scattered pulmonary micronodules measuring up to 5 mm. Follow-up guidelines as below.          Physical Examination Review of Systems   /53   Pulse 103   Temp 98.9  F (37.2  C) (Oral)   Resp 17   Ht 1.727 m (5' 8\")   Wt 72.5 kg (159 lb 14.4 oz)   SpO2 95%   BMI 24.31 kg/m    Body mass index is 24.31 kg/m .  Wt Readings from Last 3 Encounters:   08/20/22 72.5 kg (159 lb 14.4 oz)     General Appearance:   no distress, normal body habitus   ENT/Mouth: membranes moist, no oral lesions or " bleeding gums.      EYES:  no scleral icterus, normal conjunctivae   Neck: no carotid bruits or thyromegaly   Chest/Lungs:   lungs are clear to auscultation, no rales or wheezing, equal chest wall expansion    Cardiovascular:   Regular. Normal first and second heart sounds withno murmurs, rubs, or gallops; the carotid, radial and posterior tibial pulses are intact, no JVD or LE bilaterally    Abdomen:  no organomegaly, masses, bruits, or tenderness; bowel sounds are present   Extremities: no cyanosis or clubbing   Skin: no xanthelasma, warm.    Neurologic: alert and oriented x3, calm     Psychiatric: alert and oriented x3, calm     A complete 10 systems ROS was reviewed  And is negative except what is listed in the HPI.          Medical History  Surgical History Family History Social History   Past Medical History:   Diagnosis Date     Hypertension     History reviewed. No pertinent surgical history. no family history of premature coronary artery disease Social History     Socioeconomic History     Marital status: Single     Spouse name: Not on file     Number of children: Not on file     Years of education: Not on file     Highest education level: Not on file   Occupational History     Not on file   Tobacco Use     Smoking status: Former Smoker     Types: Cigarettes     Smokeless tobacco: Never Used   Substance and Sexual Activity     Alcohol use: Not Currently     Drug use: Not Currently     Sexual activity: Not Currently   Other Topics Concern     Not on file   Social History Narrative     Not on file     Social Determinants of Health     Financial Resource Strain: Not on file   Food Insecurity: Not on file   Transportation Needs: Not on file   Physical Activity: Not on file   Stress: Not on file   Social Connections: Not on file   Intimate Partner Violence: Not on file   Housing Stability: Not on file           Lab Results    Chemistry/lipid CBC Cardiac Enzymes/BNP/TSH/INR   Lab Results   Component Value Date     "BUN 18 08/19/2022     08/19/2022    CO2 23 08/19/2022    Lab Results   Component Value Date    WBC 13.9 (H) 08/19/2022    HGB 15.5 08/19/2022    HCT 46.5 08/19/2022    MCV 90 08/19/2022     08/19/2022    Lab Results   Component Value Date    TROPONINI 5.09 (HH) 08/18/2022    BNP 1,016 (H) 08/18/2022    INR 1.13 08/18/2022     Lab Results   Component Value Date    TROPONINI 5.09 (HH) 08/18/2022          Weight:    Wt Readings from Last 3 Encounters:   08/20/22 72.5 kg (159 lb 14.4 oz)       Allergies  Allergies   Allergen Reactions     Hydralazine Palpitations and Other (See Comments)     \"Pounding heart, throbbing all over, heart raising\"     Penicillins Hives     Childhood reaction ~5-6 years old but reports accidentally prescribed penicillin recently and tolerated so think she may have grown out of the allergy          Surgical History  History reviewed. No pertinent surgical history.    Social History  Tobacco:   History   Smoking Status     Former Smoker     Types: Cigarettes   Smokeless Tobacco     Never Used    Alcohol:   Social History    Substance and Sexual Activity      Alcohol use: Not Currently   Illicit Drugs:   History   Drug Use Unknown       Family History  History reviewed. No pertinent family history.       Kervin Salinas MD on 8/20/2022      cc: No primary care provider on file.    "

## 2022-08-20 NOTE — PLAN OF CARE
[facility] - ICU    RN Progress Note:            Pertinent Assessments:      Please refer to flowsheet rows for full assessment     Pt is alert and oriented,  had angiogram without intervention yet this evening. Denied any chest pain, but BP elevated after procedure, PRN Hydralazine given. Heparin was turned off right before angiogram, was discontinued after procedure.               Mobility Level:     bedrest         Key Events - This Shift:     C/O heart racing, nausea, Dr Sims was notified and Dr Fitzgerald came to see patient and ordered 12 lead EKG.               Plan:     Pt wanted to make decision after Cardiac MRI done.      Rita Burns RN

## 2022-08-20 NOTE — PROGRESS NOTES
Progress Note  Patient admitted to the intensivist service for acute CHF exacerbation.  Also with non-ST elevation with high troponin.  On IV heparin.  Cardiology managing.  Intensivist transferred to medical service today      Assessment/Plan  Non-ST elevation MI  -Elevated troponin on admission  -EKG some ST changes in anterior leads.  -S/p angiogram which showed     Mid LAD lesion is 100% stenosed. Distal vessel weaky supported fromDiag 1 collaterals    Prox RCA to Mid RCA lesion is 20% stenosed.  -Planned for cardiac MRI today.  -Appreciate cardiology input.      Heart failure preserved ejection fraction, acute pulmonary edema  -Rrequired BiPAP on admission but now on oxygen  -Diuretics per cardiology  -Echo showed LVEF 15 to 20%    Probable pneumonia with leukocytosis,  -Continue levaquin for now    HypoK -  Replete as needed.     DVT PPX :     Barriers to discharge : Cardiac MRi    Anticipated Discharge : 1-2 days, likely home on discharge        Subjective  Patient is new to me. Chart reviewed and events noted.  Patient seen and examined.   Resting but easy to arouse. States feels much better. No chest pain, N/V, SOB, dizziness this morning.     Exam:  General: Pleasant, NAD  HEENT:EOMI, AT,NC  CVS:RRR, no edema  RS:Diminished BS  Abd: Soft, NT,ND  Neurology:Grossly normal  Psy:Approrpiate affect      Objective  Vital signs in last 24 hours  Temp:  [98.3  F (36.8  C)-98.9  F (37.2  C)] 98.5  F (36.9  C)  Pulse:  [] 96  Resp:  [17-22] 17  BP: (115-165)/(53-89) 125/59  SpO2:  [93 %-98 %] 96 %    Input and Output in 24 hrs     Intake/Output Summary (Last 24 hours) at 8/19/2022 1655  Last data filed at 8/19/2022 1530  Gross per 24 hour   Intake 435.3 ml   Output 3325 ml   Net -2889.7 ml           Recent Results (from the past 24 hour(s))   Heparin Unfractionated Anti Xa Level    Collection Time: 08/19/22  1:50 PM   Result Value Ref Range    Anti Xa Unfractionated Heparin 0.17 For Reference Range, See  Comment IU/mL   Potassium    Collection Time: 08/19/22  1:50 PM   Result Value Ref Range    Potassium 3.5 3.5 - 5.0 mmol/L   ECG 12-LEAD WITH MUSE (LHE)    Collection Time: 08/19/22 10:36 PM   Result Value Ref Range    Systolic Blood Pressure  mmHg    Diastolic Blood Pressure  mmHg    Ventricular Rate 113 BPM    Atrial Rate 113 BPM    WV Interval 136 ms    QRS Duration 80 ms     ms    QTc 493 ms    P Axis 57 degrees    R AXIS -15 degrees    T Axis 75 degrees    Interpretation ECG       Sinus tachycardia  Possible Left atrial enlargement  Septal infarct (cited on or before 18-AUG-2022)  Possible Inferior infarct , age undetermined  Abnormal ECG  When compared with ECG of 18-AUG-2022 06:02,  Serial changes of Septal infarct Present  Confirmed by ANAY CULVER MD LOC:[facility] ([id number]) on 8/20/2022 8:06:17 AM     Extra Purple Top Tube    Collection Time: 08/20/22  4:42 AM   Result Value Ref Range    Hold Specimen JIC    Potassium    Collection Time: 08/20/22  4:46 AM   Result Value Ref Range    Potassium 3.7 3.5 - 5.0 mmol/L       EKG Results reviewed       Advanced Care Planning      Case d/w patient, bedside RN    Lis Elliott MD  Hospitalist
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
## 2022-08-20 NOTE — SIGNIFICANT EVENT
"Significant Event Note    Time of event: 10:49 PM August 19, 2022    Description of event:  Called to assess patient due to her feeling nauseated and feeling palpitations. Per nurse patient had cath today and planning for cardiac MRI tomorrow. Patient was feeling fine after the cath and was given 10 mg hydralazine for HTN. Shortly after getting this she started to feel nauseated and felt palpitations. She did get tachycardic during that time.     BP (!) 140/62   Pulse 113   Temp 98.4  F (36.9  C) (Oral)   Resp 15   Ht 1.727 m (5' 8\")   Wt 75.1 kg (165 lb 8 oz)   SpO2 98%   BMI 25.16 kg/m    Gen: Not diaphoretic, laying in bed  Abd: Non tender to palpation  CV: Tachycardic  Resp: No increased work of breathing       Plan:  Tachycardia  Patient with NSTEMI andplan for cardiac MRI tomorrow, now with palpitations and tachycardia and nausea. Concerned for an acute cardiac event vs anxiety vs hypotension after hydralazine. Symptoms starting to improve.   -EKG  -Offered Zofran but patient declined and wanted to try and rest to see if improving    EKG unchanged from prior, do not think trop will be helpful in setting of cath today. If symptoms persist will reach out to cardiology.     Discussed with: bedside nurse    June Sims MD    "

## 2022-08-21 LAB
CREAT SERPL-MCNC: 0.65 MG/DL (ref 0.6–1.1)
ERYTHROCYTE [DISTWIDTH] IN BLOOD BY AUTOMATED COUNT: 14.4 % (ref 10–15)
GFR SERPL CREATININE-BSD FRML MDRD: >90 ML/MIN/1.73M2
HCT VFR BLD AUTO: 45.7 % (ref 35–47)
HGB BLD-MCNC: 15.3 G/DL (ref 11.7–15.7)
MCH RBC QN AUTO: 30.5 PG (ref 26.5–33)
MCHC RBC AUTO-ENTMCNC: 33.5 G/DL (ref 31.5–36.5)
MCV RBC AUTO: 91 FL (ref 78–100)
PLATELET # BLD AUTO: 198 10E3/UL (ref 150–450)
POTASSIUM BLD-SCNC: 3.7 MMOL/L (ref 3.5–5)
RBC # BLD AUTO: 5.02 10E6/UL (ref 3.8–5.2)
WBC # BLD AUTO: 12.9 10E3/UL (ref 4–11)

## 2022-08-21 PROCEDURE — 84132 ASSAY OF SERUM POTASSIUM: CPT | Performed by: INTERNAL MEDICINE

## 2022-08-21 PROCEDURE — 250N000013 HC RX MED GY IP 250 OP 250 PS 637: Performed by: INTERNAL MEDICINE

## 2022-08-21 PROCEDURE — 82565 ASSAY OF CREATININE: CPT | Performed by: INTERNAL MEDICINE

## 2022-08-21 PROCEDURE — 200N000001 HC R&B ICU

## 2022-08-21 PROCEDURE — 36415 COLL VENOUS BLD VENIPUNCTURE: CPT | Performed by: INTERNAL MEDICINE

## 2022-08-21 PROCEDURE — 85014 HEMATOCRIT: CPT | Performed by: INTERNAL MEDICINE

## 2022-08-21 PROCEDURE — 99232 SBSQ HOSP IP/OBS MODERATE 35: CPT | Performed by: HOSPITALIST

## 2022-08-21 PROCEDURE — 99232 SBSQ HOSP IP/OBS MODERATE 35: CPT | Performed by: INTERNAL MEDICINE

## 2022-08-21 RX ORDER — POTASSIUM CHLORIDE 1500 MG/1
20 TABLET, EXTENDED RELEASE ORAL ONCE
Status: COMPLETED | OUTPATIENT
Start: 2022-08-21 | End: 2022-08-21

## 2022-08-21 RX ADMIN — LEVOFLOXACIN 750 MG: 750 TABLET, FILM COATED ORAL at 05:24

## 2022-08-21 RX ADMIN — POTASSIUM CHLORIDE 20 MEQ: 1500 TABLET, EXTENDED RELEASE ORAL at 05:24

## 2022-08-21 RX ADMIN — PANTOPRAZOLE SODIUM 40 MG: 40 TABLET, DELAYED RELEASE ORAL at 06:51

## 2022-08-21 ASSESSMENT — ACTIVITIES OF DAILY LIVING (ADL)
ADLS_ACUITY_SCORE: 28

## 2022-08-21 NOTE — PROGRESS NOTES
"[facility] - ICU    RN Progress Note:            Pertinent Assessments:      Please refer to flowsheet rows for full assessment              Mobility Level:     Up in hallway and to bathroom with standby assistance. Somewhat weak after \"being in bed too much\". SCD's on         Key Events - This Shift:     Uneventful shift.              Plan:     Cardiology to read MR on Monday and discuss next steps with patient.                   "

## 2022-08-21 NOTE — PLAN OF CARE
[facility] - ICU     RN Progress Note:              Pertinent Assessments:      Please refer to flowsheet rows for full assessment      Pt is alert and oriented,  had Cardiac MRI done. Denied any chest pain. Vitals stable.                Mobility Level:      2           Key Events - This Shift:      Removed watson this morning. Pt voids well.               Plan:      Waiting cardiac MRI result.       Rita Burns RN

## 2022-08-21 NOTE — PROGRESS NOTES
Care Management Follow Up    Length of Stay (days): 3    Expected Discharge Date: 08/21/2022     Concerns to be Addressed:     Further cardiac input  Patient plan of care discussed at interdisciplinary rounds: Yes    Anticipated Discharge Disposition:  Home     Anticipated Discharge Services:  TBD  Anticipated Discharge DME:  TBD    Patient/family educated on Medicare website which has current facility and service quality ratings:    Education Provided on the Discharge Plan:    Patient/Family in Agreement with the Plan:      Referrals Placed by CM/SW:  None at this time  Private pay costs discussed: Not applicable    Additional Information:  Goal is to return to home at discharge. CM to follow for medical progression, recommendations and final discharge plan.    Radha Menendez RN

## 2022-08-21 NOTE — PROGRESS NOTES
"  HEART CARE NOTE          Assessment/Recommendations     1. HFrEF c/b ADHF/cardiogenic pulmonary edema  Assessment / Plan    Tolerating oral diuretic regimen - no changes at this time    Echo significant for severely decreased LVSF which appears new and WMAs - coronary angio remarkable for  of LAD - CMRI viability study in process     2. NSTEMI  Assessment / Plan    S/p coronary angiogram significant for  of LAD - awiating viability study      History of Present Illness/Subjective      Ms. Radha Hansen is a 72 year old female with no significant past cardiac history who presents in cardiogenic pulmonary.     Today, Mrs. Hansen denies any acute cardiac events or complaints; Management plan as detailed above.        ECG: Personally reviewed.Sinus tachycardia     ECHO:   The left atrium is mildly dilated.  There is borderline concentric left ventricular hypertrophy.  The visual ejection fraction is 15-20%.  Diastolic Doppler findings (E/E' ratio and/or other parameters) suggest left  ventricular filling pressures are increased.  There is anterior, septal, and apical wall akinesis.  There is inferior wall akinesis.  Mildly decreased right ventricular systolic function  No significant valve abnormality.     CT chest:  IMPRESSION:  1.  No pulmonary embolus.  2.  Cardiomegaly and pulmonary edema. Superimposed infection is also the differential.  3.  Small bilateral effusions.  4.  Scattered pulmonary micronodules measuring up to 5 mm. Follow-up guidelines as below.          Physical Examination Review of Systems   BP (!) 140/63 (BP Location: Left arm)   Pulse 77   Temp 99.3  F (37.4  C) (Oral)   Resp 17   Ht 1.727 m (5' 8\")   Wt 72.5 kg (159 lb 14.4 oz)   SpO2 95%   BMI 24.31 kg/m    Body mass index is 24.31 kg/m .  Wt Readings from Last 3 Encounters:   08/20/22 72.5 kg (159 lb 14.4 oz)     General Appearance:   no distress, normal body habitus   ENT/Mouth: membranes moist, no oral lesions or bleeding gums.   "    EYES:  no scleral icterus, normal conjunctivae   Neck: no carotid bruits or thyromegaly   Chest/Lungs:   lungs are clear to auscultation, no rales or wheezing, equal chest wall expansion    Cardiovascular:   Regular. Normal first and second heart sounds withno murmurs, rubs, or gallops; the carotid, radial and posterior tibial pulses are intact, no JVD or LE edema bilaterally    Abdomen:  no organomegaly, masses, bruits, or tenderness; bowel sounds are present   Extremities: no cyanosis or clubbing   Skin: no xanthelasma, warm.    Neurologic: alert and oriented x3, calm     Psychiatric: alert and oriented x3, calm     A complete 10 systems ROS was reviewed  And is negative except what is listed in the HPI.          Medical History  Surgical History Family History Social History   Past Medical History:   Diagnosis Date     Hypertension     History reviewed. No pertinent surgical history. no family history of premature coronary artery disease Social History     Socioeconomic History     Marital status: Single     Spouse name: Not on file     Number of children: Not on file     Years of education: Not on file     Highest education level: Not on file   Occupational History     Not on file   Tobacco Use     Smoking status: Former Smoker     Types: Cigarettes     Smokeless tobacco: Never Used   Substance and Sexual Activity     Alcohol use: Not Currently     Drug use: Not Currently     Sexual activity: Not Currently   Other Topics Concern     Not on file   Social History Narrative     Not on file     Social Determinants of Health     Financial Resource Strain: Not on file   Food Insecurity: Not on file   Transportation Needs: Not on file   Physical Activity: Not on file   Stress: Not on file   Social Connections: Not on file   Intimate Partner Violence: Not on file   Housing Stability: Not on file           Lab Results    Chemistry/lipid CBC Cardiac Enzymes/BNP/TSH/INR   Lab Results   Component Value Date    BUN 18  "08/19/2022     08/19/2022    CO2 23 08/19/2022    Lab Results   Component Value Date    WBC 12.9 (H) 08/21/2022    HGB 15.3 08/21/2022    HCT 45.7 08/21/2022    MCV 91 08/21/2022     08/21/2022    Lab Results   Component Value Date    TROPONINI 5.09 (HH) 08/18/2022    BNP 1,016 (H) 08/18/2022    INR 1.13 08/18/2022     Lab Results   Component Value Date    TROPONINI 5.09 (HH) 08/18/2022          Weight:    Wt Readings from Last 3 Encounters:   08/20/22 72.5 kg (159 lb 14.4 oz)       Allergies  Allergies   Allergen Reactions     Hydralazine Palpitations and Other (See Comments)     \"Pounding heart, throbbing all over, heart raising\"     Penicillins Hives     Childhood reaction ~5-6 years old but reports accidentally prescribed penicillin recently and tolerated so think she may have grown out of the allergy          Surgical History  History reviewed. No pertinent surgical history.    Social History  Tobacco:   History   Smoking Status     Former Smoker     Types: Cigarettes   Smokeless Tobacco     Never Used    Alcohol:   Social History    Substance and Sexual Activity      Alcohol use: Not Currently   Illicit Drugs:   History   Drug Use Unknown       Family History  History reviewed. No pertinent family history.       Kervin Salinas MD on 8/21/2022      cc: No Ref-Primary, Physician    "

## 2022-08-21 NOTE — PROGRESS NOTES
Progress Note  Patient admitted to the intensivist service for acute CHF exacerbation.  Also with non-ST elevation with high troponin. Cardiology following.     Assessment/Plan  Non-ST elevation MI  -Elevated troponin on admission  -EKG some ST changes in anterior leads.  -S/p angiogram which showed     Mid LAD lesion is 100% stenosed. Distal vessel weaky supported fromDiag 1 collaterals    Prox RCA to Mid RCA lesion is 20% stenosed.  -Cardiac MRI done on 8/20, unable to view result in Epic.   -Appreciate cardiology input.      Heart failure preserved ejection fraction, acute pulmonary edema  -Required BiPAP on admission but weaned off of all supplemental o2, So2 stable on room air.   -Diuretics per cardiology  -Echo showed LVEF 15 to 20%    Probable pneumonia with leukocytosis,  -Continue levaquin for now    HypoK -  Replete as needed.     DVT PPX : SCD    Barriers to discharge : Pending further cardiology input    Anticipated Discharge : 1-2 days, likely home on discharge      Subjective  Chart reviewed and events noted.  Patient seen and examined.   In bed, reports nausea after she took levaquin. No emesis. No chest pain, SOB, dizziness.       Exam:  General: Pleasant, NAD  HEENT:EOMI, AT,NC  CVS:RRR, no edema  RS:Diminished BS  Abd: Soft, NT,ND  Neurology:Grossly normal  Psy:Approrpiate affect      Objective  Vital signs in last 24 hours  Temp:  [98.2  F (36.8  C)-99.3  F (37.4  C)] 98.2  F (36.8  C)  Pulse:  [] 95  Resp:  [16-20] 16  BP: (137-154)/(63-70) 149/68  Cuff Mean (mmHg):  [91] 91  SpO2:  [94 %-98 %] 95 %    Input and Output in 24 hrs     Intake/Output Summary (Last 24 hours) at 8/19/2022 1655  Last data filed at 8/19/2022 1530  Gross per 24 hour   Intake 435.3 ml   Output 3325 ml   Net -2889.7 ml           Recent Results (from the past 24 hour(s))   Creatinine    Collection Time: 08/21/22  4:31 AM   Result Value Ref Range    Creatinine 0.65 0.60 - 1.10 mg/dL    GFR Estimate >90 >60 mL/min/1.73m2    CBC with platelets    Collection Time: 08/21/22  4:31 AM   Result Value Ref Range    WBC Count 12.9 (H) 4.0 - 11.0 10e3/uL    RBC Count 5.02 3.80 - 5.20 10e6/uL    Hemoglobin 15.3 11.7 - 15.7 g/dL    Hematocrit 45.7 35.0 - 47.0 %    MCV 91 78 - 100 fL    MCH 30.5 26.5 - 33.0 pg    MCHC 33.5 31.5 - 36.5 g/dL    RDW 14.4 10.0 - 15.0 %    Platelet Count 198 150 - 450 10e3/uL   Potassium    Collection Time: 08/21/22  4:31 AM   Result Value Ref Range    Potassium 3.7 3.5 - 5.0 mmol/L       EKG Results reviewed       Advanced Care Planning      Case d/w patient.    Lis Elliott MD  Hospitalist

## 2022-08-21 NOTE — PLAN OF CARE
[facility] - ICU    RN Progress Note:            Pertinent Assessments:      Please refer to flowsheet rows for full assessment              Mobility Level:     4         Key Events - This Shift:     Nausea  with  small vomitting after levofloxacin.Offered zofran but refused.              Plan:     Cardiolgist to discuss cardiac MRI result.         Point of Contact Update

## 2022-08-22 ENCOUNTER — TELEPHONE (OUTPATIENT)
Dept: CARDIOLOGY | Facility: CLINIC | Age: 72
End: 2022-08-22

## 2022-08-22 VITALS
HEIGHT: 68 IN | RESPIRATION RATE: 18 BRPM | DIASTOLIC BLOOD PRESSURE: 67 MMHG | BODY MASS INDEX: 23.28 KG/M2 | OXYGEN SATURATION: 96 % | WEIGHT: 153.6 LBS | HEART RATE: 98 BPM | SYSTOLIC BLOOD PRESSURE: 144 MMHG | TEMPERATURE: 99.5 F

## 2022-08-22 DIAGNOSIS — I50.21 ACUTE SYSTOLIC HEART FAILURE (H): Primary | ICD-10-CM

## 2022-08-22 DIAGNOSIS — I50.22 CHRONIC SYSTOLIC HEART FAILURE (H): Primary | ICD-10-CM

## 2022-08-22 LAB
ERYTHROCYTE [DISTWIDTH] IN BLOOD BY AUTOMATED COUNT: 14 % (ref 10–15)
HCT VFR BLD AUTO: 45.7 % (ref 35–47)
HGB BLD-MCNC: 15.2 G/DL (ref 11.7–15.7)
HOLD SPECIMEN: NORMAL
HOLD SPECIMEN: NORMAL
MCH RBC QN AUTO: 30 PG (ref 26.5–33)
MCHC RBC AUTO-ENTMCNC: 33.3 G/DL (ref 31.5–36.5)
MCV RBC AUTO: 90 FL (ref 78–100)
PLATELET # BLD AUTO: 233 10E3/UL (ref 150–450)
POTASSIUM BLD-SCNC: 3.8 MMOL/L (ref 3.5–5)
RBC # BLD AUTO: 5.07 10E6/UL (ref 3.8–5.2)
WBC # BLD AUTO: 10.8 10E3/UL (ref 4–11)

## 2022-08-22 PROCEDURE — 36415 COLL VENOUS BLD VENIPUNCTURE: CPT | Performed by: INTERNAL MEDICINE

## 2022-08-22 PROCEDURE — 250N000013 HC RX MED GY IP 250 OP 250 PS 637: Performed by: INTERNAL MEDICINE

## 2022-08-22 PROCEDURE — 99232 SBSQ HOSP IP/OBS MODERATE 35: CPT | Performed by: INTERNAL MEDICINE

## 2022-08-22 PROCEDURE — 84132 ASSAY OF SERUM POTASSIUM: CPT | Performed by: INTERNAL MEDICINE

## 2022-08-22 PROCEDURE — 85027 COMPLETE CBC AUTOMATED: CPT | Performed by: INTERNAL MEDICINE

## 2022-08-22 PROCEDURE — 99239 HOSP IP/OBS DSCHRG MGMT >30: CPT | Performed by: INTERNAL MEDICINE

## 2022-08-22 RX ORDER — NITROGLYCERIN 0.4 MG/1
TABLET SUBLINGUAL
Qty: 10 TABLET | Refills: 0 | Status: SHIPPED | OUTPATIENT
Start: 2022-08-22

## 2022-08-22 RX ORDER — POTASSIUM CHLORIDE 1500 MG/1
20 TABLET, EXTENDED RELEASE ORAL ONCE
Status: COMPLETED | OUTPATIENT
Start: 2022-08-22 | End: 2022-08-22

## 2022-08-22 RX ADMIN — POTASSIUM CHLORIDE 20 MEQ: 1500 TABLET, EXTENDED RELEASE ORAL at 08:02

## 2022-08-22 RX ADMIN — LEVOFLOXACIN 750 MG: 750 TABLET, FILM COATED ORAL at 06:34

## 2022-08-22 RX ADMIN — PANTOPRAZOLE SODIUM 40 MG: 40 TABLET, DELAYED RELEASE ORAL at 07:54

## 2022-08-22 ASSESSMENT — ACTIVITIES OF DAILY LIVING (ADL)
ADLS_ACUITY_SCORE: 28

## 2022-08-22 NOTE — PLAN OF CARE
[facility] - ICU     RN Progress Note:              Pertinent Assessments:      Please refer to flowsheet rows for full assessment      Pt is alert and oriented. Independent in the room. Denied any chest pain. Vitals stable.                Mobility Level:      Independent. Ambulated in the hallway and in the room.           Key Events - This Shift:       Uneventful              Plan:      Waiting for cardiac MRI result interpreted by cardiologist.       Rita Burns RN
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
## 2022-08-22 NOTE — PROGRESS NOTES
"  HEART CARE NOTE          Assessment/Recommendations     1. HFrEF c/b ADHF/cardiogenic pulmonary edema  Assessment / Plan    Tolerating oral diuretic regimen - no changes at this time    Echo significant for severely decreased LVSF which appears new and WMAs - coronary angio remarkable for  of LAD - CMRI viability study in process     2. NSTEMI  Assessment / Plan    S/p coronary angiogram significant for  of LAD - awaiting viability study       History of Present Illness/Subjective      Ms. Radha Hansen is a 72 year old female with no significant past cardiac history who presents in cardiogenic pulmonary.     Today, Mrs. Hansen denies any acute cardiac events or complaints; Management plan as detailed above.        ECG: Personally reviewed.Sinus tachycardia     ECHO:   The left atrium is mildly dilated.  There is borderline concentric left ventricular hypertrophy.  The visual ejection fraction is 15-20%.  Diastolic Doppler findings (E/E' ratio and/or other parameters) suggest left  ventricular filling pressures are increased.  There is anterior, septal, and apical wall akinesis.  There is inferior wall akinesis.  Mildly decreased right ventricular systolic function  No significant valve abnormality.     CT chest:  IMPRESSION:  1.  No pulmonary embolus.  2.  Cardiomegaly and pulmonary edema. Superimposed infection is also the differential.  3.  Small bilateral effusions.  4.  Scattered pulmonary micronodules measuring up to 5 mm. Follow-up guidelines as below.          Physical Examination Review of Systems   BP (!) 156/71 (BP Location: Left arm)   Pulse 87   Temp 98.8  F (37.1  C) (Oral)   Resp 20   Ht 1.727 m (5' 8\")   Wt 69.7 kg (153 lb 9.6 oz)   SpO2 97%   BMI 23.35 kg/m    Body mass index is 23.35 kg/m .  Wt Readings from Last 3 Encounters:   08/22/22 69.7 kg (153 lb 9.6 oz)     General Appearance:   no distress, normal body habitus   ENT/Mouth: membranes moist, no oral lesions or bleeding gums.    "   EYES:  no scleral icterus, normal conjunctivae   Neck: no carotid bruits or thyromegaly   Chest/Lungs:   lungs are clear to auscultation, no rales or wheezing, equal chest wall expansion    Cardiovascular:   Regular. Normal first and second heart sounds with no murmurs, rubs, or gallops; the carotid, radial and posterior tibial pulses are intact, no JVD or LE edema bilaterally    Abdomen:  no organomegaly, masses, bruits, or tenderness; bowel sounds are present   Extremities: no cyanosis or clubbing   Skin: no xanthelasma, warm.    Neurologic: alert and oriented x3, calm     Psychiatric: alert and oriented x3, calm     A complete 10 systems ROS was reviewed  And is negative except what is listed in the HPI.          Medical History  Surgical History Family History Social History   Past Medical History:   Diagnosis Date     Hypertension     History reviewed. No pertinent surgical history. no family history of premature coronary artery disease Social History     Socioeconomic History     Marital status: Single     Spouse name: Not on file     Number of children: Not on file     Years of education: Not on file     Highest education level: Not on file   Occupational History     Not on file   Tobacco Use     Smoking status: Former Smoker     Types: Cigarettes     Smokeless tobacco: Never Used   Substance and Sexual Activity     Alcohol use: Not Currently     Drug use: Not Currently     Sexual activity: Not Currently   Other Topics Concern     Not on file   Social History Narrative     Not on file     Social Determinants of Health     Financial Resource Strain: Not on file   Food Insecurity: Not on file   Transportation Needs: Not on file   Physical Activity: Not on file   Stress: Not on file   Social Connections: Not on file   Intimate Partner Violence: Not on file   Housing Stability: Not on file           Lab Results    Chemistry/lipid CBC Cardiac Enzymes/BNP/TSH/INR   Lab Results   Component Value Date    BUN 18  "08/19/2022     08/19/2022    CO2 23 08/19/2022    Lab Results   Component Value Date    WBC 12.9 (H) 08/21/2022    HGB 15.3 08/21/2022    HCT 45.7 08/21/2022    MCV 91 08/21/2022     08/21/2022    Lab Results   Component Value Date    TROPONINI 5.09 (HH) 08/18/2022    BNP 1,016 (H) 08/18/2022    INR 1.13 08/18/2022     Lab Results   Component Value Date    TROPONINI 5.09 (HH) 08/18/2022          Weight:    Wt Readings from Last 3 Encounters:   08/22/22 69.7 kg (153 lb 9.6 oz)       Allergies  Allergies   Allergen Reactions     Hydralazine Palpitations and Other (See Comments)     \"Pounding heart, throbbing all over, heart raising\"     Penicillins Hives     Childhood reaction ~5-6 years old but reports accidentally prescribed penicillin recently and tolerated so think she may have grown out of the allergy          Surgical History  History reviewed. No pertinent surgical history.    Social History  Tobacco:   History   Smoking Status     Former Smoker     Types: Cigarettes   Smokeless Tobacco     Never Used    Alcohol:   Social History    Substance and Sexual Activity      Alcohol use: Not Currently   Illicit Drugs:   History   Drug Use Unknown       Family History  History reviewed. No pertinent family history.       Kervin Salinas MD on 8/22/2022      cc: No Ref-Primary, Physician    "

## 2022-08-22 NOTE — PLAN OF CARE
[facility] - ICU    RN Progress Note:            Pertinent Assessments:      Please refer to flowsheet rows for full assessment     Pt remains angina equivalent symptoms free. Vital signs stable .      Mobility Level:     Ambulates in room without visible distress.      Key Events - This Shift:     Pt. was visited by Dr. Salinas, cardiologist. Results of viability study were presented to and implications discussed with a patient.  From cardiology standpoint pt is ready for discharge.             Plan:     Hospitalist notified to address discharge plans.

## 2022-08-22 NOTE — PLAN OF CARE
Problem: Hypertension Comorbidity  Goal: Blood Pressure in Desired Range  Outcome: Ongoing, Progressing     Problem: Cardiac-Related Pain (Acute Coronary Syndrome)  Goal: Absence of Cardiac-Related Pain  Outcome: Ongoing, Progressing   Goal Outcome Evaluation:      [facility] - ICU    RN Progress Note:            Pertinent Assessments:      Please refer to flowsheet rows for full assessment     /94 and 156/71, temp 99.2F at midnight, 98.8F at 0400.          Mobility Level:     5, walked to the BR independently         Key Events - This Shift:     Patient denied chest pain or any discomfort, denied shortness of breath as well with activity. Diminished breath sounds, SR with occasional PVCs.              Plan:     Will replace potassium per protocol. Waiting for cardiac MRI result.

## 2022-08-22 NOTE — PROGRESS NOTES
Progress Note  Patient admitted to the intensivist service for acute CHF exacerbation.  Also with non-ST elevation with high troponin. Cardiology following.     Assessment/Plan  Non-ST elevation MI  -Elevated troponin on admission  -EKG some ST changes in anterior leads.  -S/p angiogram which showed     Mid LAD lesion is 100% stenosed. Distal vessel weaky supported fromDiag 1 collaterals    Prox RCA to Mid RCA lesion is 20% stenosed.  -Cardiac MRI done on 8/20- similar result is pending  -Appreciate cardiology input.      Heart failure preserved ejection fraction  Acute pulmonary edema  -Required BiPAP on admission but weaned off of all supplemental o2, So2 stable on room air.   -Diuretics per cardiology  -Echo showed LVEF 15 to 20%    Probable pneumonia with leukocytosis,  -Continue levaquin for 1-2 days    HypoK -  Replete as needed.     DVT PPX : SCD    Barriers to discharge : Pending further cardiology recommendation and viability study report    Anticipated Discharge : 1-2 days, likely home on discharge      Subjective  No new complaint today and no acute events overnight.  She denies chest pain, dizziness, shortness of breath or palpitation.      Exam:  General: Pleasant, NAD  HEENT:EOMI, AT,NC  CVS:RRR, no edema  RS:Diminished BS  Abd: Soft, NT,ND  Neurology:Grossly normal  Psy:Approrpiate affect      Objective  Vital signs in last 24 hours  Temp:  [98.1  F (36.7  C)-99.2  F (37.3  C)] 98.1  F (36.7  C)  Pulse:  [85-95] 85  Resp:  [17-21] 18  BP: (125-156)/(60-94) 137/60  SpO2:  [95 %-97 %] 95 %    Input and Output in 24 hrs     Intake/Output Summary (Last 24 hours) at 8/19/2022 1655  Last data filed at 8/19/2022 1530  Gross per 24 hour   Intake 435.3 ml   Output 3325 ml   Net -2889.7 ml           Recent Results (from the past 24 hour(s))   Potassium    Collection Time: 08/22/22  3:38 AM   Result Value Ref Range    Potassium 3.8 3.5 - 5.0 mmol/L   Extra Purple Top Tube    Collection Time: 08/22/22  3:38 AM    Result Value Ref Range    Hold Specimen JIC        EKG Results reviewed       Advanced Care Planning      Case discussed with patient.  Pineda Rodriguez MD  Hospitalist

## 2022-08-23 NOTE — PROGRESS NOTES
[facility] was introduced to the patient today including our role in the home management of their heart failure.  Heart Failure Education Materials were shared today with the patient.  Introduction to the expectations including daily weight tracking using the Daily Weight Log  Home B/P monitoring as appropriate( to bring in home monitoring cuff to the clinic appointment for verification)    Radha is interested in HRS patient home monitoring for ongoing management of her metrics. This will be ordered for her today.    Low Sodium diet of 2000mg or less daily, review of label reading, aim for fresh and avoid processed items.  Radha consumes a diet that she prepares and follows the keto guidelines. Reviewed the sodium restriction in detail and she feels she can make some further modifications that will be in line with this. Advised to use her resources including the [facility] to best manage this lifestyle change.    Daily Fluid restriction of 2000ml  considerations  Exercise importance as able explained  Monitor and report s/s of heart failure. How to report these changes.  Follow up appointments and testing expectations. She was provided with F/U appt with the HF NAVEEN on her AVS at the time of discharge.    Rhoda MAJANO RN BSN, CHFN    [facility]   [phone] Nurse [facility]   [facility]   [address] [place] [address]

## 2022-08-24 ENCOUNTER — PATIENT OUTREACH (OUTPATIENT)
Dept: CARE COORDINATION | Facility: CLINIC | Age: 72
End: 2022-08-24

## 2022-08-24 NOTE — PROGRESS NOTES
Clinic Care Coordination Contact  [facility]/Voicemail       Clinical Data: Care Coordinator Outreach  Outreach attempted x 2.  Left message on patient's voicemail with call back information and requested return call.  Plan:Care Coordinator will try to reach patient again in 1-2 business days.    Judit Joshi, [facility]  [phone]  [facility]

## 2022-09-12 ENCOUNTER — TELEPHONE (OUTPATIENT)
Dept: CARDIOLOGY | Facility: CLINIC | Age: 72
End: 2022-09-12

## 2022-09-12 NOTE — TELEPHONE ENCOUNTER
"Patient was contacted to discuss her HRS kit. She reports that the kit was not delivered and when she checked the tracking number, then she noted it was returned to the company. She was ultimately okay with this as she states that she purchased an oximeter and b/p cuff on her own and is tracking it daily.    She will bring this booklet tracking to her appt to share with provider.     Radha states she only eats one meal per day and that she was not doing well at all on ZERO sodium, therefore she added in some \"REDMANS Salt\" and that she is feeling a lot better now. Cautioned her on the use of ANY salt of salt substitutes.  Reinforced that most patients consume enough naturally occurring sodium in their diet that they do not have to add salt and should not do this.    She was very adamant that she is a very spiritually guided person both physically and mentally. And in this regard, she has been doing well, and has lost about 6# since her discharge, and is tolerating her return to work without concerns.    Rhoda MAJANO RN BSN, CHFN, PCCN-K    FYI to Melissa King, SHAILESH  Re: f/u appt 9/20/22 *(she rescheduled this out additional time on her own)  "

## 2022-09-20 ENCOUNTER — OFFICE VISIT (OUTPATIENT)
Dept: CARDIOLOGY | Facility: CLINIC | Age: 72
End: 2022-09-20
Payer: MEDICARE

## 2022-09-20 VITALS
RESPIRATION RATE: 16 BRPM | BODY MASS INDEX: 23.87 KG/M2 | HEART RATE: 84 BPM | WEIGHT: 157 LBS | DIASTOLIC BLOOD PRESSURE: 78 MMHG | SYSTOLIC BLOOD PRESSURE: 146 MMHG

## 2022-09-20 DIAGNOSIS — I25.10 CORONARY ARTERY DISEASE INVOLVING NATIVE CORONARY ARTERY OF NATIVE HEART WITHOUT ANGINA PECTORIS: ICD-10-CM

## 2022-09-20 DIAGNOSIS — I50.20 HEART FAILURE WITH REDUCED EJECTION FRACTION (H): Primary | ICD-10-CM

## 2022-09-20 DIAGNOSIS — I21.4 NON-STEMI (NON-ST ELEVATED MYOCARDIAL INFARCTION) (H): ICD-10-CM

## 2022-09-20 DIAGNOSIS — I10 BENIGN ESSENTIAL HYPERTENSION: ICD-10-CM

## 2022-09-20 DIAGNOSIS — E78.2 MIXED HYPERLIPIDEMIA: ICD-10-CM

## 2022-09-20 DIAGNOSIS — I25.5 ISCHEMIC CARDIOMYOPATHY: ICD-10-CM

## 2022-09-20 LAB
ANION GAP SERPL CALCULATED.3IONS-SCNC: 13 MMOL/L (ref 7–15)
BUN SERPL-MCNC: 15.7 MG/DL (ref 8–23)
CALCIUM SERPL-MCNC: 9.3 MG/DL (ref 8.8–10.2)
CHLORIDE SERPL-SCNC: 107 MMOL/L (ref 98–107)
CREAT SERPL-MCNC: 0.68 MG/DL (ref 0.51–0.95)
DEPRECATED HCO3 PLAS-SCNC: 22 MMOL/L (ref 22–29)
GFR SERPL CREATININE-BSD FRML MDRD: >90 ML/MIN/1.73M2
GLUCOSE SERPL-MCNC: 97 MG/DL (ref 70–99)
POTASSIUM SERPL-SCNC: 4.2 MMOL/L (ref 3.4–5.3)
SODIUM SERPL-SCNC: 142 MMOL/L (ref 136–145)

## 2022-09-20 PROCEDURE — 80048 BASIC METABOLIC PNL TOTAL CA: CPT | Performed by: NURSE PRACTITIONER

## 2022-09-20 PROCEDURE — 99204 OFFICE O/P NEW MOD 45 MIN: CPT | Performed by: NURSE PRACTITIONER

## 2022-09-20 PROCEDURE — 80061 LIPID PANEL: CPT | Performed by: NURSE PRACTITIONER

## 2022-09-20 PROCEDURE — 36415 COLL VENOUS BLD VENIPUNCTURE: CPT | Performed by: NURSE PRACTITIONER

## 2022-09-20 RX ORDER — ATORVASTATIN CALCIUM 40 MG/1
40 TABLET, FILM COATED ORAL DAILY
Qty: 90 TABLET | Refills: 1 | Status: SHIPPED | OUTPATIENT
Start: 2022-09-20 | End: 2024-07-10

## 2022-09-20 RX ORDER — ASPIRIN 81 MG/1
81 TABLET, CHEWABLE ORAL DAILY
Qty: 90 TABLET | Refills: 3 | Status: SHIPPED | OUTPATIENT
Start: 2022-09-20

## 2022-09-20 RX ORDER — METOPROLOL SUCCINATE 25 MG/1
25 TABLET, EXTENDED RELEASE ORAL DAILY
Qty: 90 TABLET | Refills: 1 | Status: SHIPPED | OUTPATIENT
Start: 2022-09-20 | End: 2024-07-10

## 2022-09-20 NOTE — PATIENT INSTRUCTIONS
Radha Hansen,    It was a pleasure to see you today at [facility].     My recommendations after this visit include:  - Please follow up with Dr Salinas in 6 weeks   - Please follow up with Melissa King in 3 weeks  - Please start aspirin 81 mg daily  - Please start metoprolol 25 mg daily  - Please start atorvastatin 40 mg daily  - I have checked labs and will contact you with results    Melissa King CNP      What is the [facility]?     The [facility] is a heart failure specialty clinic within [facility].  You will work with your cardiologist, nurse practitioner, and nurses to carefully adjust medications and learn how to live with heart failure.  The Heart Failure Program will help you:    Better understand your chronic heart condition  Feel better and avoid hospital stays    Monitoring for Symptoms      Call the Heart Failure Phone Line ([phone]) if you have any of these symptoms:   Increased shortness of breath/shortness of breath at rest  Waking up at night with difficulty breathing  Unable to lie down for sleep due to symptoms or needing to sit upright for sleep  Weight gain of 2 pounds a day for 2 days in a row OR 5 pounds in 1 week  Increased swelling in your ankles or legs  Dizziness or lightheadedness    Medications     Take your medications as prescribed  Bring all your medications in their original bottles to every appointment  Avoid non-steroidal anti-inflammatory medications (Advil, Aleve, Ibuprofen, Naprosyn, Naproxen, Celebrex)  Do not stop taking your medications or begin taking over-the-counter or herbal medications without first talking to your doctor or nurse practitioner    Diet and Lifestyle     Limit sodium/salt to 2000 mg daily   Read food labels for sodium content  Do not add salt when cooking or add salt at the table  Weigh yourself every day and record in your daily weight log   Call if you gain 2  pounds a day for 2 days in a row OR 5 pounds in 1 week  Bring daily weight log to every appointment  Stay active, pace yourself, listen to your body, and rest when tired  Elevate your legs if they are swollen. Ask about using compression/support stockings  Stop smoking  Lose weight if you are overweight  Avoid drinking alcohol or limit amount  Stay updated on your immunizations including flu and pneumonia vaccines

## 2022-09-20 NOTE — LETTER
9/20/2022    Physician No Ref-Primary  No address on file    RE: Radha Hansen       Dear Colleague,     I had the pleasure of seeing Radha Hansen in the [facility].        Assessment/Recommendations   Assessment:    1.  Heart failure with reduced ejection fraction, LVEF 38%, NYHA class II: Compensated.  She has mild fatigue and occasional dyspnea on exertion.  She states she feels well since hospitalization.  Her weight has decreased and is 154 pounds at home.  2.  Hypertension: Elevated.  Blood pressure 146/78.  She does monitor her blood pressure at home and states her blood pressure is typically 140-150/70  3.  CAD: Denies chest pain.  Status post non-STEMI with coronary angiogram showing  of mid LAD. MRI showed low probability of viability.  Not currently on any appropriate medications.  She had previously been on aspirin approximately 4 years ago when she had a primary doctor.  She was supposed to be taking atorvastatin 4 years ago which she never took she states.  She states she is very sensitive to medications.  She was on lisinopril at one time and did not tolerate well but does not remember reaction she had    Plan:  1.   Heart failure medications:  - Beta blocker therapy start metoprolol succinate 25 mg daily  2.  Start aspirin 81 mg daily  3.  Start atorvastatin 40 mg daily  4.  BMP and lipid profile pending.  She is fasting today.  5.  Encourage regular activity  6.  Recommend establishing with a new primary provider    Radha Hansen will follow up with Dr Salinas in 6 weeks and in the heart failure clinic in 3 weeks.     History of Present Illness/Subjective    Ms. Radha Hansen is a 72 year old female seen at [facility] heart failure clinic today for continued follow-up.  She follows up for heart failure with reduced ejection fraction.  She was hospitalized August 18 to August 22 with a non STEMI and heart failure.  She had an echocardiogram which showed an ejection  fraction of 15 to 20%.  She underwent coronary angiogram which showed  of her mid LAD.  She had a cardiac MRI to look for viability which showed ejection fraction of 38% and showed low probability of viability.  She has a past medical history is significant for hypertension, CAD, non-STEMI, hyperlipidemia.    Today, he states he is feeling well.  She has mild fatigue and dyspnea on exertion.  She denies lightheadedness, shortness of breath, orthopnea, PND, palpitations, chest pain, abdominal fullness/bloating and lower extremity edema.    She is monitoring home weights which have decreased to 154 pounds.  She is following a low sodium diet.  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      ECHOCARDIOGRAM: 8/18/2022  Interpretation Summary     The left atrium is mildly dilated.  There is borderline concentric left ventricular hypertrophy.  The visual ejection fraction is 15-20%.  Diastolic Doppler findings (E/E' ratio and/or other parameters) suggest left  ventricular filling pressures are increased.  There is anterior, septal, and apical wall akinesis.  There is inferior wall akinesis.  Mildly decreased right ventricular systolic function  No significant valve abnormality    Coronary angiogram: 8/19/2022  Pre Procedure Diagnosis    NSTEMI + WMAs         Conclusion      Mid LAD lesion is 100% stenosed. Distal vessel weaky supported fromDiag 1 collaterals    Prox RCA to Mid RCA lesion is 20% stenosed.           Plan      Follow bedrest per protocol    Continued medical management and lifestyle modifications for cardiovascular risk factor optimizations.    Arterial sheath removed from radial artery with TR band placement.     Cardiac MRI: 8/2/2022  SUMMARY   ==========================================================================================================     1.  Left ventricular size is moderately enlarged. Wall thickness is normal.  Systolic function is  moderately reduced with [id number]  anterior, anteroseptal, and apical akinesis. The quantified left  ventricular ejection fraction is 38%.   2.  Normal right ventricular size and systolic function.  The quantified right ventricular ejection  fraction is 66%.   3. There is a moderate-sized area of near transmural scar in the [id number] anterior, anteroseptal, and  apical walls of the left ventricle suggestive of prior myocardial infarction in the territory of the left  anterior descending artery. These wall segments have a low probability of viability. No significant scar  seen in the territories of the right coronary or left circumflex arteries.  4.  Mild left atrial enlargement.  5.  No significant valvular abnormalities.     Physical Examination Review of Systems   BP (!) 146/78 (BP Location: Right arm, Patient Position: Sitting, Cuff Size: Adult Regular)   Pulse 84   Resp 16   Wt 71.2 kg (157 lb)   BMI 23.87 kg/m    Body mass index is 23.87 kg/m .  Wt Readings from Last 3 Encounters:   09/20/22 71.2 kg (157 lb)   08/22/22 69.7 kg (153 lb 9.6 oz)       General Appearance:   no acute distress   ENT/Mouth: Wearing mask   EYES:  no scleral icterus, normal conjunctivae   Neck: no thyromegaly   Chest/Lungs:   lungs are clear to auscultation, no rales or wheezing, equal chest wall expansion    Cardiovascular:   Regular. Normal first and second heart sounds with no murmurs, rubs, or gallops; Jugular venous pressure normal, no edema bilaterally    Abdomen:  no organomegaly, masses, bruits, or tenderness; bowel sounds are present   Extremities: no cyanosis or clubbing   Skin: no xanthelasma, warm.    Neurologic: normal  bilateral, no tremors     Psychiatric: alert and oriented x3                                              Medical History  Surgical History Family History Social History   Past Medical History:   Diagnosis Date     Congestive heart failure (H)      Coronary artery disease      Hyperlipidemia      Hypertension      Myocardial  infarction (H)     Past Surgical History:   Procedure Laterality Date     CORONARY ANGIOGRAPHY ADULT ORDER       CV CORONARY ANGIOGRAM N/A 08/19/2022    Procedure: CV CORONARY ANGIOGRAM;  Surgeon: Neeraj Scales MD;  Location: [facility] CATH LAB CV     CV LEFT HEART CATH N/A 08/19/2022    Procedure: Left Heart Catheterization;  Surgeon: Neeraj Scales MD;  Location: [facility] CATH LAB CV     CV LEFT VENTRICULOGRAM N/A 08/19/2022    Procedure: Left Ventriculogram;  Surgeon: Neeraj Scales MD;  Location: [facility] CATH LAB CV    No family history on file. Social History     Socioeconomic History     Marital status: Single     Spouse name: Not on file     Number of children: Not on file     Years of education: Not on file     Highest education level: Not on file   Occupational History     Not on file   Tobacco Use     Smoking status: Former Smoker     Types: Cigarettes     Smokeless tobacco: Never Used   Substance and Sexual Activity     Alcohol use: Not Currently     Drug use: Not Currently     Sexual activity: Not Currently   Other Topics Concern     Not on file   Social History Narrative     Not on file     Social Determinants of Health     Financial Resource Strain: Not on file   Food Insecurity: Not on file   Transportation Needs: Not on file   Physical Activity: Not on file   Stress: Not on file   Social Connections: Not on file   Intimate Partner Violence: Not on file   Housing Stability: Not on file          Medications  Allergies   Current Outpatient Medications   Medication Sig Dispense Refill     aspirin (ASA) 81 MG chewable tablet Take 1 tablet (81 mg) by mouth daily 90 tablet 3     atorvastatin (LIPITOR) 40 MG tablet Take 1 tablet (40 mg) by mouth daily 90 tablet 1     metoprolol succinate ER (TOPROL XL) 25 MG 24 hr tablet Take 1 tablet (25 mg) by mouth daily 90 tablet 1     nitroGLYcerin (NITROSTAT) 0.4 MG sublingual tablet For chest pain place 1 tablet under the tongue every 5 minutes for 3  "doses. If symptoms persist 5 minutes after 1st dose call 911. 10 tablet 0     VITAMIN D-VITAMIN K PO Take 1 capsule by mouth daily Dr. Ibrahim's brand. Contains vitamin D3 10,000 units, vitamin K2 100mcg, zinc 20mg, & magnesium 25mg      Allergies   Allergen Reactions     Hydralazine Palpitations and Other (See Comments)     \"Pounding heart, throbbing all over, heart raising\"     Lisinopril      Penicillins Hives     Childhood reaction ~5-6 years old but reports accidentally prescribed penicillin recently and tolerated so think she may have grown out of the allergy          Lab Results    Chemistry/lipid CBC Cardiac Enzymes/BNP/TSH/INR   Lab Results   Component Value Date    BUN 18 08/19/2022     08/19/2022    CO2 23 08/19/2022    Lab Results   Component Value Date    WBC 10.8 08/22/2022    HGB 15.2 08/22/2022    HCT 45.7 08/22/2022    MCV 90 08/22/2022     08/22/2022    Lab Results   Component Value Date    TROPONINI 5.09 (HH) 08/18/2022    BNP 1,016 (H) 08/18/2022    INR 1.13 08/18/2022             This note has been dictated using voice recognition software. Any grammatical, typographical, or context distortions are unintentional and inherent to the software    30 minutes spent on the date of encounter doing chart review, review of outside records, review of test results, interpretation with above tests, patient visit and documentation.                  Thank you for allowing me to participate in the care of your patient.      Sincerely,     CARMEN Murillo [facility]  cc:   No referring provider defined for this encounter.        "

## 2022-09-20 NOTE — PROGRESS NOTES
Assessment/Recommendations   Assessment:    1.  Heart failure with reduced ejection fraction, LVEF 38%, NYHA class II: Compensated.  She has mild fatigue and occasional dyspnea on exertion.  She states she feels well since hospitalization.  Her weight has decreased and is 154 pounds at home.  2.  Hypertension: Elevated.  Blood pressure 146/78.  She does monitor her blood pressure at home and states her blood pressure is typically 140-150/70  3.  CAD: Denies chest pain.  Status post non-STEMI with coronary angiogram showing  of mid LAD. MRI showed low probability of viability.  Not currently on any appropriate medications.  She had previously been on aspirin approximately 4 years ago when she had a primary doctor.  She was supposed to be taking atorvastatin 4 years ago which she never took she states.  She states she is very sensitive to medications.  She was on lisinopril at one time and did not tolerate well but does not remember reaction she had    Plan:  1.   Heart failure medications:  - Beta blocker therapy start metoprolol succinate 25 mg daily  2.  Start aspirin 81 mg daily  3.  Start atorvastatin 40 mg daily  4.  BMP and lipid profile pending.  She is fasting today.  5.  Encourage regular activity  6.  Recommend establishing with a new primary provider    Radha Hansen will follow up with Dr Salinas in 6 weeks and in the heart failure clinic in 3 weeks.     History of Present Illness/Subjective    Ms. Radha Hansen is a 72 year old female seen at [facility] heart failure clinic today for continued follow-up.  She follows up for heart failure with reduced ejection fraction.  She was hospitalized August 18 to August 22 with a non STEMI and heart failure.  She had an echocardiogram which showed an ejection fraction of 15 to 20%.  She underwent coronary angiogram which showed  of her mid LAD.  She had a cardiac MRI to look for viability which showed ejection fraction of 38% and showed low  probability of viability.  She has a past medical history is significant for hypertension, CAD, non-STEMI, hyperlipidemia.    Today, he states he is feeling well.  She has mild fatigue and dyspnea on exertion.  She denies lightheadedness, shortness of breath, orthopnea, PND, palpitations, chest pain, abdominal fullness/bloating and lower extremity edema.    She is monitoring home weights which have decreased to 154 pounds.  She is following a low sodium diet.  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      ECHOCARDIOGRAM: 8/18/2022  Interpretation Summary     The left atrium is mildly dilated.  There is borderline concentric left ventricular hypertrophy.  The visual ejection fraction is 15-20%.  Diastolic Doppler findings (E/E' ratio and/or other parameters) suggest left  ventricular filling pressures are increased.  There is anterior, septal, and apical wall akinesis.  There is inferior wall akinesis.  Mildly decreased right ventricular systolic function  No significant valve abnormality    Coronary angiogram: 8/19/2022  Pre Procedure Diagnosis    NSTEMI + WMAs         Conclusion      Mid LAD lesion is 100% stenosed. Distal vessel weaky supported fromDiag 1 collaterals    Prox RCA to Mid RCA lesion is 20% stenosed.           Plan      Follow bedrest per protocol    Continued medical management and lifestyle modifications for cardiovascular risk factor optimizations.    Arterial sheath removed from radial artery with TR band placement.     Cardiac MRI: 8/2/2022  SUMMARY   ==========================================================================================================     1.  Left ventricular size is moderately enlarged. Wall thickness is normal.  Systolic function is  moderately reduced with [id number] anterior, anteroseptal, and apical akinesis. The quantified left  ventricular ejection fraction is 38%.   2.  Normal right ventricular size and systolic function.  The quantified right  ventricular ejection  fraction is 66%.   3. There is a moderate-sized area of near transmural scar in the [id number] anterior, anteroseptal, and  apical walls of the left ventricle suggestive of prior myocardial infarction in the territory of the left  anterior descending artery. These wall segments have a low probability of viability. No significant scar  seen in the territories of the right coronary or left circumflex arteries.  4.  Mild left atrial enlargement.  5.  No significant valvular abnormalities.     Physical Examination Review of Systems   BP (!) 146/78 (BP Location: Right arm, Patient Position: Sitting, Cuff Size: Adult Regular)   Pulse 84   Resp 16   Wt 71.2 kg (157 lb)   BMI 23.87 kg/m    Body mass index is 23.87 kg/m .  Wt Readings from Last 3 Encounters:   09/20/22 71.2 kg (157 lb)   08/22/22 69.7 kg (153 lb 9.6 oz)       General Appearance:   no acute distress   ENT/Mouth: Wearing mask   EYES:  no scleral icterus, normal conjunctivae   Neck: no thyromegaly   Chest/Lungs:   lungs are clear to auscultation, no rales or wheezing, equal chest wall expansion    Cardiovascular:   Regular. Normal first and second heart sounds with no murmurs, rubs, or gallops; Jugular venous pressure normal, no edema bilaterally    Abdomen:  no organomegaly, masses, bruits, or tenderness; bowel sounds are present   Extremities: no cyanosis or clubbing   Skin: no xanthelasma, warm.    Neurologic: normal  bilateral, no tremors     Psychiatric: alert and oriented x3                                              Medical History  Surgical History Family History Social History   Past Medical History:   Diagnosis Date     Congestive heart failure (H)      Coronary artery disease      Hyperlipidemia      Hypertension      Myocardial infarction (H)     Past Surgical History:   Procedure Laterality Date     CORONARY ANGIOGRAPHY ADULT ORDER       CV CORONARY ANGIOGRAM N/A 08/19/2022    Procedure: CV CORONARY ANGIOGRAM;   Surgeon: Neeraj Scales MD;  Location: [facility] CATH LAB CV     CV LEFT HEART CATH N/A 08/19/2022    Procedure: Left Heart Catheterization;  Surgeon: Neeraj Scales MD;  Location: [facility] CATH LAB CV     CV LEFT VENTRICULOGRAM N/A 08/19/2022    Procedure: Left Ventriculogram;  Surgeon: Neeraj Scales MD;  Location: [facility] CATH LAB CV    No family history on file. Social History     Socioeconomic History     Marital status: Single     Spouse name: Not on file     Number of children: Not on file     Years of education: Not on file     Highest education level: Not on file   Occupational History     Not on file   Tobacco Use     Smoking status: Former Smoker     Types: Cigarettes     Smokeless tobacco: Never Used   Substance and Sexual Activity     Alcohol use: Not Currently     Drug use: Not Currently     Sexual activity: Not Currently   Other Topics Concern     Not on file   Social History Narrative     Not on file     Social Determinants of Health     Financial Resource Strain: Not on file   Food Insecurity: Not on file   Transportation Needs: Not on file   Physical Activity: Not on file   Stress: Not on file   Social Connections: Not on file   Intimate Partner Violence: Not on file   Housing Stability: Not on file          Medications  Allergies   Current Outpatient Medications   Medication Sig Dispense Refill     aspirin (ASA) 81 MG chewable tablet Take 1 tablet (81 mg) by mouth daily 90 tablet 3     atorvastatin (LIPITOR) 40 MG tablet Take 1 tablet (40 mg) by mouth daily 90 tablet 1     metoprolol succinate ER (TOPROL XL) 25 MG 24 hr tablet Take 1 tablet (25 mg) by mouth daily 90 tablet 1     nitroGLYcerin (NITROSTAT) 0.4 MG sublingual tablet For chest pain place 1 tablet under the tongue every 5 minutes for 3 doses. If symptoms persist 5 minutes after 1st dose call 911. 10 tablet 0     VITAMIN D-VITAMIN K PO Take 1 capsule by mouth daily Dr. Ibrahim's brand. Contains vitamin D3 10,000 units, vitamin  "K2 100mcg, zinc 20mg, & magnesium 25mg      Allergies   Allergen Reactions     Hydralazine Palpitations and Other (See Comments)     \"Pounding heart, throbbing all over, heart raising\"     Lisinopril      Penicillins Hives     Childhood reaction ~5-6 years old but reports accidentally prescribed penicillin recently and tolerated so think she may have grown out of the allergy          Lab Results    Chemistry/lipid CBC Cardiac Enzymes/BNP/TSH/INR   Lab Results   Component Value Date    BUN 18 08/19/2022     08/19/2022    CO2 23 08/19/2022    Lab Results   Component Value Date    WBC 10.8 08/22/2022    HGB 15.2 08/22/2022    HCT 45.7 08/22/2022    MCV 90 08/22/2022     08/22/2022    Lab Results   Component Value Date    TROPONINI 5.09 (HH) 08/18/2022    BNP 1,016 (H) 08/18/2022    INR 1.13 08/18/2022             This note has been dictated using voice recognition software. Any grammatical, typographical, or context distortions are unintentional and inherent to the software    30 minutes spent on the date of encounter doing chart review, review of outside records, review of test results, interpretation with above tests, patient visit and documentation.              "

## 2022-09-20 NOTE — LETTER
September 23, 2022      Radha Hansen  [address] [facility] [address] [place] [id number]        Dear MsAmandaJade,    We are writing to inform you of your test results.    Your kidney functions and electrolytes are stable. Your lipid profile shows elevated cholesterol and LDL (bad cholesterol). We are wanting to ensure you have picked up your medications and started taking them. Please give us a call at [phone]. Additionally, we are wanting you to follow up with me in 3 weeks and Dr. Salinas in 6 weeks. Please call [phone] to schedule these appointments.       Resulted Orders   Basic metabolic panel   Result Value Ref Range    Sodium 142 136 - 145 mmol/L    Potassium 4.2 3.4 - 5.3 mmol/L    Chloride 107 98 - 107 mmol/L    Carbon Dioxide (CO2) 22 22 - 29 mmol/L    Anion Gap 13 7 - 15 mmol/L    Urea Nitrogen 15.7 8.0 - 23.0 mg/dL    Creatinine 0.68 0.51 - 0.95 mg/dL    Calcium 9.3 8.8 - 10.2 mg/dL    Glucose 97 70 - 99 mg/dL    GFR Estimate >90 >60 mL/min/1.73m2      Comment:      Effective December 21, 2021 eGFRcr in adults is calculated using the 2021 CKD-EPI creatinine equation which includes age and gender (Ila et al., NEJ, DOI: 10.1056/PYESkm6412999)   Lipid Profile   Result Value Ref Range    Cholesterol 220 (H) <200 mg/dL    Triglycerides 94 <150 mg/dL    Direct Measure HDL 46 (L) >=50 mg/dL    LDL Cholesterol Calculated 155 (H) <=100 mg/dL    Non HDL Cholesterol 174 (H) <130 mg/dL    Narrative    Cholesterol  Desirable:  <200 mg/dL    Triglycerides  Normal:  Less than 150 mg/dL  Borderline High:  150-199 mg/dL  High:  200-499 mg/dL  Very High:  Greater than or equal to 500 mg/dL    Direct Measure HDL  Female:  Greater than or equal to 50 mg/dL   Male:  Greater than or equal to 40 mg/dL    LDL Cholesterol  Desirable:  <100mg/dL  Above Desirable:  100-129 mg/dL   Borderline High:  130-159 mg/dL   High:  160-189 mg/dL   Very High:  >= 190 mg/dL    Non HDL Cholesterol  Desirable:  130 mg/dL  Above Desirable:   130-159 mg/dL  Borderline High:  160-189 mg/dL  High:  190-219 mg/dL  Very High:  Greater than or equal to 220 mg/dL       If you have any questions or concerns, please call the clinic at the number listed above.       Sincerely,      CARMEN Murillo CNP

## 2022-09-20 NOTE — Clinical Note
Dr Perry,  I wanted to have you review this patient's case. She was hospitalized mid August with a non-stemi and heart failure. Had angio which showed  of LAD. Unfortunately she was not started on any appropriate medications by rounder or proceduralist.   Today I saw her for follow up for hospitalization and heart failure and started her on ASA, metoprolol and atorvastatin for now.   Lupe recommended that I let you know her case since you are the head of the cath lab. Thank you so much.  Melissa King CNP

## 2022-09-20 NOTE — LETTER
Date:September 21, 2022      Provider requested that no letter be sent. Do not send.       [facility]

## 2022-09-21 LAB
CHOLEST SERPL-MCNC: 220 MG/DL
HDLC SERPL-MCNC: 46 MG/DL
LDLC SERPL CALC-MCNC: 155 MG/DL
NONHDLC SERPL-MCNC: 174 MG/DL
TRIGL SERPL-MCNC: 94 MG/DL

## 2022-09-22 ENCOUNTER — TELEPHONE (OUTPATIENT)
Dept: CARDIOLOGY | Facility: CLINIC | Age: 72
End: 2022-09-22

## 2022-10-01 ENCOUNTER — HEALTH MAINTENANCE LETTER (OUTPATIENT)
Age: 72
End: 2022-10-01

## 2023-10-11 ENCOUNTER — APPOINTMENT (OUTPATIENT)
Dept: ULTRASOUND IMAGING | Facility: HOSPITAL | Age: 73
End: 2023-10-11
Attending: EMERGENCY MEDICINE
Payer: MEDICARE

## 2023-10-11 ENCOUNTER — HOSPITAL ENCOUNTER (EMERGENCY)
Facility: HOSPITAL | Age: 73
Discharge: HOME | End: 2023-10-11
Attending: EMERGENCY MEDICINE | Admitting: EMERGENCY MEDICINE
Payer: MEDICARE

## 2023-10-11 VITALS
HEART RATE: 93 BPM | DIASTOLIC BLOOD PRESSURE: 65 MMHG | TEMPERATURE: 99.4 F | WEIGHT: 155 LBS | OXYGEN SATURATION: 96 % | RESPIRATION RATE: 18 BRPM | SYSTOLIC BLOOD PRESSURE: 133 MMHG | BODY MASS INDEX: 23.57 KG/M2

## 2023-10-11 DIAGNOSIS — I80.01 THROMBOPHLEBITIS OF SUPERFICIAL VEINS OF RIGHT LOWER EXTREMITY: ICD-10-CM

## 2023-10-11 PROCEDURE — 93971 EXTREMITY STUDY: CPT | Mod: RT

## 2023-10-11 PROCEDURE — 99284 EMERGENCY DEPT VISIT MOD MDM: CPT | Mod: 25

## 2023-10-11 ASSESSMENT — ACTIVITIES OF DAILY LIVING (ADL): ADLS_ACUITY_SCORE: 33

## 2023-10-11 NOTE — DISCHARGE INSTRUCTIONS
Your ultrasound does not show any signs of DVT.  There is a superficial blood clot in your calf.  Apply warm compress intermittently and follow-up closely with your primary care doctor.  Return to the ER for any worsening symptoms or other concerns.

## 2023-10-11 NOTE — ED PROVIDER NOTES
EMERGENCY DEPARTMENT ENCOUnter      NAME: Radha Hansen  AGE: 73 year old female  YOB: 1950  MRN: [id number]  EVALUATION DATE & TIME: 10/11/2023  2:23 PM    PCP: No Ref-Primary, Physician    ED PROVIDER: Ephraim Desai DO      Chief Complaint   Patient presents with    Leg Pain         FINAL IMPRESSION:  1. Thrombophlebitis of superficial veins of right lower extremity          ED COURSE & MEDICAL DECISION MAKIN:45 PM I met with the patient for the initial interview and physical examination. Discussed plan for treatment and workup in the ED.         The patient presented to the emergency department today complaining of right lower leg pain.  She had an area of erythema and tenderness along the medial aspect of the right lower leg.  DVT reveals evidence of superficial thrombophlebitis at the site of her symptoms.  No DVT.  She has been given instructions on appropriate management as well as instructions to follow-up closely on an outpatient basis.  She is comfortable with this plan.        Medical Decision Making    History:  Supplemental history from: Documented in chart, if applicable  External Record(s) reviewed: Documented in chart, if applicable.    Work Up:  Chart documentation includes differential considered and any EKGs or imaging independently interpreted by provider, where specified.  In additional to work up documented, I considered the following work up: Documented in chart, if applicable.    External consultation:  Discussion of management with another provider: Documented in chart, if applicable    Complicating factors:  Care impacted by chronic illness: Heart Disease, Hyperlipidemia, Hypertension, and Other: NSTEMI  Care affected by social determinants of health: N/A    Disposition considerations: Discharge. No recommendations on prescription strength medication(s). See documentation for any additional details.        At the conclusion of the encounter I discussed the results of  "all of the tests and the disposition. The questions were answered. The patient or family acknowledged understanding and was agreeable with the care plan.          =================================================================    HPI        Radha Hansen is a 73 year old female with a pertinent history of hypertension, hyperlipidemia, CAD CHF with reduced ejection, NSTEMI who presents to this ED via personal vehicle for evaluation of leg pain.    Patient reports right calf pain with increased swelling,warmth, and spot or redness since Friday. She states it is tender to palpitated the area of redness.         PAST MEDICAL HISTORY:  Past Medical History:   Diagnosis Date    Congestive heart failure (H)     Coronary artery disease     Hyperlipidemia     Hypertension     Myocardial infarction (H)        PAST SURGICAL HISTORY:  Past Surgical History:   Procedure Laterality Date    CORONARY ANGIOGRAPHY ADULT ORDER      CV CORONARY ANGIOGRAM N/A 08/19/2022    Procedure: CV CORONARY ANGIOGRAM;  Surgeon: Neeraj Scales MD;  Location: [facility] CV    CV LEFT HEART CATH N/A 08/19/2022    Procedure: Left Heart Catheterization;  Surgeon: Neeraj Scales MD;  Location: [facility]    CV LEFT VENTRICULOGRAM N/A 08/19/2022    Procedure: Left Ventriculogram;  Surgeon: Neeraj Scales MD;  Location: [facility] CV           CURRENT MEDICATIONS:    aspirin (ASA) 81 MG chewable tablet  atorvastatin (LIPITOR) 40 MG tablet  metoprolol succinate ER (TOPROL XL) 25 MG 24 hr tablet  nitroGLYcerin (NITROSTAT) 0.4 MG sublingual tablet  VITAMIN D-VITAMIN K PO        ALLERGIES:  Allergies   Allergen Reactions    Hydralazine Palpitations and Other (See Comments)     \"Pounding heart, throbbing all over, heart raising\"    Lisinopril     Penicillins Hives     Childhood reaction ~5-6 years old but reports accidentally prescribed penicillin recently and tolerated so think she may have grown out of the allergy  "       FAMILY HISTORY:  No family history on file.    SOCIAL HISTORY:   Social History     Socioeconomic History    Marital status: Single   Tobacco Use    Smoking status: Former     Types: Cigarettes    Smokeless tobacco: Never   Substance and Sexual Activity    Alcohol use: Not Currently    Drug use: Not Currently    Sexual activity: Not Currently       VITALS:  Patient Vitals for the past 24 hrs:   BP Temp Temp src Pulse Resp SpO2 Weight   10/11/23 1510 133/65 -- -- 93 -- 96 % --   10/11/23 1340 (!) 172/77 99.4  F (37.4  C) Tympanic 98 18 99 % 70.3 kg (155 lb)       PHYSICAL EXAM    VITAL SIGNS: /65   Pulse 93   Temp 99.4  F (37.4  C) (Tympanic)   Resp 18   Wt 70.3 kg (155 lb)   SpO2 96%   BMI 23.57 kg/m     Constitutional:  Well developed, Well nourished,  HENT:  Normocephalic, Atraumatic, Oropharynx moist, Nose normal.   Neck:  Normal range of motion, Supple, No stridor.   Eyes:  EOMI, Conjunctiva normal, No discharge.   Respiratory:  Breathing comfortably, No respiratory distress,    Cardiovascular:  Normal pulses   Musculoskeletal:  No cyanosis, no deformities Tenderness of right calf with localized area of erythema. Varicose veins in area of symptoms.   Integument:  Dry, No erythema, No rash.  Neurologic:  Alert & oriented x 3, No obvious focal deficits noted.   Psychiatric:  Affect normal, Judgment normal, Mood normal.            RADIOLOGY:  I have independently reviewed and interpreted the above imaging, pending the final radiology read.  US Lower Extremity Venous Duplex Right   Final Result   IMPRESSION:   1.  No deep venous thrombosis in the right lower extremity.   2.  Proximal calf nonocclusive superficial vein thrombus.            I, Mayra Malik, am serving as a scribe to document services personally performed by Dr. Desai based on my observation and the provider's statements to me. I, Ephraim Desai, DO attest that Mayra Malik is acting in a scribe capacity, has observed my  performance of the services and has documented them in accordance with my direction.    Ephraim Desai DO  Emergency Medicine  [facility] EMERGENCY DEPARTMENT  [facility] [facility] [id number] [phone]  Dept: [phone]     Ephraim Desai MD  10/11/23 9531

## 2023-10-11 NOTE — ED TRIAGE NOTES
Patient arrives by private car for evaluation of right calf pain that started on Friday.  Now has area of redness, hot to the touch and hard, concerning for DVT.

## 2023-10-21 ENCOUNTER — HEALTH MAINTENANCE LETTER (OUTPATIENT)
Age: 73
End: 2023-10-21

## 2024-05-31 ENCOUNTER — APPOINTMENT (OUTPATIENT)
Dept: ULTRASOUND IMAGING | Facility: HOSPITAL | Age: 74
End: 2024-05-31
Attending: EMERGENCY MEDICINE
Payer: MEDICARE

## 2024-05-31 ENCOUNTER — HOSPITAL ENCOUNTER (EMERGENCY)
Facility: HOSPITAL | Age: 74
Discharge: HOME | End: 2024-05-31
Attending: EMERGENCY MEDICINE | Admitting: EMERGENCY MEDICINE
Payer: MEDICARE

## 2024-05-31 VITALS
DIASTOLIC BLOOD PRESSURE: 70 MMHG | TEMPERATURE: 97.5 F | WEIGHT: 153.4 LBS | RESPIRATION RATE: 19 BRPM | SYSTOLIC BLOOD PRESSURE: 155 MMHG | OXYGEN SATURATION: 95 % | HEART RATE: 79 BPM | BODY MASS INDEX: 23.32 KG/M2

## 2024-05-31 DIAGNOSIS — I73.9 COLD FOOT WITH PERIPHERAL VASCULAR DISEASE (H): ICD-10-CM

## 2024-05-31 DIAGNOSIS — M79.661 RIGHT CALF PAIN: ICD-10-CM

## 2024-05-31 LAB
APTT PPP: 27 SECONDS (ref 22–38)
BASOPHILS # BLD AUTO: 0.1 10E3/UL (ref 0–0.2)
BASOPHILS NFR BLD AUTO: 1 %
EOSINOPHIL # BLD AUTO: 0.2 10E3/UL (ref 0–0.7)
EOSINOPHIL NFR BLD AUTO: 3 %
ERYTHROCYTE [DISTWIDTH] IN BLOOD BY AUTOMATED COUNT: 14.4 % (ref 10–15)
HCT VFR BLD AUTO: 46.1 % (ref 35–47)
HGB BLD-MCNC: 15.4 G/DL (ref 11.7–15.7)
IMM GRANULOCYTES # BLD: 0 10E3/UL
IMM GRANULOCYTES NFR BLD: 1 %
INR PPP: 1.19 (ref 0.85–1.15)
LYMPHOCYTES # BLD AUTO: 2.4 10E3/UL (ref 0.8–5.3)
LYMPHOCYTES NFR BLD AUTO: 27 %
MCH RBC QN AUTO: 30.3 PG (ref 26.5–33)
MCHC RBC AUTO-ENTMCNC: 33.4 G/DL (ref 31.5–36.5)
MCV RBC AUTO: 91 FL (ref 78–100)
MONOCYTES # BLD AUTO: 1.1 10E3/UL (ref 0–1.3)
MONOCYTES NFR BLD AUTO: 13 %
NEUTROPHILS # BLD AUTO: 5 10E3/UL (ref 1.6–8.3)
NEUTROPHILS NFR BLD AUTO: 55 %
NRBC # BLD AUTO: 0 10E3/UL
NRBC BLD AUTO-RTO: 0 /100
PLATELET # BLD AUTO: 203 10E3/UL (ref 150–450)
RBC # BLD AUTO: 5.09 10E6/UL (ref 3.8–5.2)
WBC # BLD AUTO: 8.8 10E3/UL (ref 4–11)

## 2024-05-31 PROCEDURE — 36415 COLL VENOUS BLD VENIPUNCTURE: CPT

## 2024-05-31 PROCEDURE — 99285 EMERGENCY DEPT VISIT HI MDM: CPT | Mod: 25

## 2024-05-31 PROCEDURE — 93005 ELECTROCARDIOGRAM TRACING: CPT

## 2024-05-31 PROCEDURE — 93971 EXTREMITY STUDY: CPT | Mod: RT

## 2024-05-31 PROCEDURE — 85025 COMPLETE CBC W/AUTO DIFF WBC: CPT

## 2024-05-31 PROCEDURE — 85610 PROTHROMBIN TIME: CPT

## 2024-05-31 PROCEDURE — 85730 THROMBOPLASTIN TIME PARTIAL: CPT

## 2024-05-31 ASSESSMENT — COLUMBIA-SUICIDE SEVERITY RATING SCALE - C-SSRS
2. HAVE YOU ACTUALLY HAD ANY THOUGHTS OF KILLING YOURSELF IN THE PAST MONTH?: NO
1. IN THE PAST MONTH, HAVE YOU WISHED YOU WERE DEAD OR WISHED YOU COULD GO TO SLEEP AND NOT WAKE UP?: NO
6. HAVE YOU EVER DONE ANYTHING, STARTED TO DO ANYTHING, OR PREPARED TO DO ANYTHING TO END YOUR LIFE?: NO

## 2024-05-31 ASSESSMENT — ACTIVITIES OF DAILY LIVING (ADL)
ADLS_ACUITY_SCORE: 35
ADLS_ACUITY_SCORE: 35

## 2024-05-31 NOTE — ED TRIAGE NOTES
Pt states that for one week she has had pain in her right calf, discoloration in her foot/cool sensation in her foot and tingling. No blood thinners.      Triage Assessment (Adult)       Row Name 05/31/24 5820          Triage Assessment    Airway WDL WDL        Respiratory WDL    Respiratory WDL WDL        Skin Circulation/Temperature WDL    Skin Circulation/Temperature WDL WDL        Cardiac WDL    Cardiac WDL WDL        Peripheral/Neurovascular WDL    Peripheral Neurovascular WDL X;neurovascular assessment lower        Cognitive/Neuro/Behavioral WDL    Cognitive/Neuro/Behavioral WDL WDL        RLE Neurovascular Assessment    Temperature RLE cool     Color RLE pale     Sensation RLE tenderness present;numbness present;tingling present

## 2024-05-31 NOTE — DISCHARGE INSTRUCTIONS
You are seen in the ED for concerns of right calf pain, cold right foot and tingling to right foot.  ED workup was reassuring and did not show any signs of blood clot on the ultrasound.  However, as we discussed I do think this may be related to peripheral arterial disease.  Because of this, I am going to provide a referral to a vascular specialist so they can do additional testing to look specifically at the vessels in your right leg.  If you begin to experience worsening pain in your right leg, noticeable swelling or redness of the leg, decreased range of motion or sensation of the right leg or foot please return to the ED.

## 2024-05-31 NOTE — ED PROVIDER NOTES
I am seeing this patient along with Malena Kaiser PA-C. I had a face to face encounter with this patient seen by the Advanced Practice Provider (NAVEEN).  I have seen, examined, and discussed the patient with the NAVEEN and agree with their assessment and plan of management. I personally saw the patient and performed a substantive portion of the visit including all aspects of the medical decision making.    HPI:  Radha Hansen is a 74 y.o. female with a pertinent history of coronary artery disease, congestive heart failure, NSTEMI, hyperlipidemia, hypertension, and pulmonary edema who presents with 1 week of right calf pain and a cold sensation, discoloration, and tingling in her right foot. Patient is on a carnivore diet and uses natural remedies to regulate blood pressure. History of superficial venous thrombosis and myocardial infarction in 2022. No history of similar symptoms, use of blood thinners or other medications, fever, chills, nausea, vomiting, cough, shortness of breath, chest pain, abdominal pain, diarrhea, or constipation.    Physical Exam: Well-appearing patient, normal vitals, ambulatory in no distress      LABS  Pertinent lab results reviewed in chart.  Labs Ordered and Resulted from Time of ED Arrival to Time of ED Departure   INR - Abnormal       Result Value    INR 1.19 (*)    PARTIAL THROMBOPLASTIN TIME - Normal    aPTT 27     CBC WITH PLATELETS AND DIFFERENTIAL    WBC Count 8.8      RBC Count 5.09      Hemoglobin 15.4      Hematocrit 46.1      MCV 91      MCH 30.3      MCHC 33.4      RDW 14.4      Platelet Count 203      % Neutrophils 55      % Lymphocytes 27      % Monocytes 13      % Eosinophils 3      % Basophils 1      % Immature Granulocytes 1      NRBCs per 100 WBC 0      Absolute Neutrophils 5.0      Absolute Lymphocytes 2.4      Absolute Monocytes 1.1      Absolute Eosinophils 0.2      Absolute Basophils 0.1      Absolute Immature Granulocytes 0.0      Absolute NRBCs 0.0          EKG      RADIOLOGY  US Lower Extremity Venous Duplex Right   Final Result   IMPRESSION:   1.  No deep venous thrombosis in the right lower extremity.   2.  Prominent varicosities along the medial distal right thigh and medial proximal right calf.          PROCEDURES       ED COURSE & MEDICAL DECISION MAKING      3:42 PM The patient was staffed with me by Malena Kaiser PA-C.    Pertinent Labs and Imagaing reviewed (see chart for details)    74 year old female here with unilateral calf pain.  Clinically, she does have symptoms that sound suggestive more of peripheral arterial disease or may be claudication, but no signs of an acute arterial occlusion that would require emergent intervention.  She has had a history of superficial venous thrombosis, no anticoagulation.  We did consider SVT, DVT.  Duplex ultrasound is fortunately negative.  At this time, I think she would be a good candidate to refer to vascular surgery for potential outpatient workup.    At the conclusion of the encounter I discussed  the results of all of the tests and the disposition.   The questions were answered.  The patient or family acknowledged understanding and was agreeable with the care plan.       FINAL IMPRESSION      1. Right calf pain    2. Cold foot with peripheral vascular disease (H24)            CRITICAL CARE  0 Minutes    I, Felisa Jacome am serving as a scribe to document services personally performed by Sarah Dougherty MD, based on my observation and the provider's statements to me. I, Sarah Dougherty MD, attest that Felisa Jacome is acting in a scribe capacity, has observed my performance of the services and has documented them in accordance with my direction.     Sarah Dougherty MD  5/31/2024 3:41 PM      Sarah Dougherty MD  05/31/24 4810

## 2024-05-31 NOTE — Clinical Note
Radha Hansen was seen and treated in our emergency department on 5/31/2024.  She may return to work on 06/03/2024.  You are seen in the ED with concerns of right calf pain.  Until patient is able to follow-up with a vascular specialist to look specifically at her vessels, please allow for activity modifications by decreasing her weekly hours as activity and exertion exacerbate her symptoms.     If you have any questions or concerns, please don't hesitate to call.      Malena Kaiser PA-C

## 2024-05-31 NOTE — ED PROVIDER NOTES
Emergency Department Encounter   NAME: Radha Hansen ; AGE: 74 year old female ; YOB: 1950 ; MRN: [id number] ; PCP: No Ref-Primary, Physician   ED PROVIDER: Malena Kaiser PA-C    Evaluation Date & Time:   No admission date for patient encounter.    CHIEF COMPLAINT:  Leg Pain      IMPRESSION AND PLAN   MDM: Radha Hansen is a 74 year old female with a pertinent history of HTN, HLD, CHF, MI, CAD who presents to the ED by car for evaluation of R calf pain. Patient reports that one week ago she woke up and noticed her R foot was cold, discolored and tingling. She reports she has not have symptoms like this before. Patient adds that the pain is most prominent when she's walking. Patient has not taken any medications for pain management. Patient states she does have a hx of superficial venous thrombosis, she was not started on blood thinners. Patient also reports a history of MI in 2022. Patient has not taken any medications following this event, however patient does use natural remedies for blood pressure control such as vitamin E and garlic. Additionally, patient reports that she is currently on a carnivore diet and only eats red meats. Patient otherwise denies fever, chills, nausea, vomiting, cough, SOB, chest pain, abdominal pain, diarrhea, constipation.     Vitals - hypertensive, afebrile, without tachycardia. On exam patient is well-appearing. R leg is without edema, erythema or warmth to palpation. Mild tenderness to R calf noted. R dorsalis pedis and posterior tibialis pulses decreased compared to L. Active ROM of toes, ankle and knee on RLE intact. Large varicose veins noted on R proximal medial thigh. Differentials include PAD, arterial emboli, DVT, superficial venous thrombosis, muscle strain, dehydration.    US RLE revealed obvious varicose veins but no evidence of DVT. CBC without leukocytosis, hemoglobin stable at 15.4. INR slightly elevated, PTT WNL. Symptoms and work up most consistent with  PAD. Patient denied medication management at this time. Informed patient of results, patient expressed her understanding. Will refer patient to vascular medicine for further management, patient is amenable to this plan.    Patient discharged but instructed to return to the emergency department with any new or worsening of symptoms. Patient expressed understanding, feels comfortable, and is in agreement with this plan. All questions addressed prior to discharge.    Patient seen in conjunction with Dr. Dougherty.    History:  Supplemental history from: N/A  External Record(s) reviewed: Documented in chart    Work Up:  Chart documentation includes differential considered and any EKGs or imaging independently interpreted by provider, where specified.  In additional to work up documented, I considered the following work up: Documented in chart, if applicable.    External consultation:  Discussion of management with another provider: Documented in chart, if applicable    Complicating factors:  Care impacted by chronic illness: Heart Disease, Hyperlipidemia, Hypertension, and Peripheral Vascular Disease  Care affected by social determinants of health: Access to Medical Care    Disposition considerations: Discharge. No recommendations on prescription strength medication(s). See documentation for any additional details.    Chart Review: Reviewed ED note from 10/11/2023.  Patient presented complaining of right lower leg pain with overlying erythema and tenderness on the medial aspect of the leg.  Workup reveals evidence of superficial thrombophlebitis without signs of DVT.  Patient was discharged home with conservative management.    ED COURSE:  3:30 PM I met and introduced myself to the patient. I gathered initial history and performed my physical exam. We discussed plan for initial workup.   3:44 PM I have staffed the patient with Dr. Dougherty ED MD, who has evaluated the patient and agrees with all aspects of today's care.    5:35 PM I rechecked the patient and discussed results, discharge, follow up, and reasons to return to the ED.         At the conclusion of the encounter I discussed the results of all the tests and the disposition. The questions were answered. The patient or family acknowledged understanding and was agreeable with the care plan.    FINAL IMPRESSION:    ICD-10-CM    1. Right calf pain  M79.661 Vascular Medicine Referral      2. Cold foot with peripheral vascular disease (H24)  I73.9 Vascular Medicine Referral            MEDICATIONS GIVEN IN THE EMERGENCY DEPARTMENT:  Medications - No data to display      NEW PRESCRIPTIONS STARTED AT TODAY'S ED VISIT:  Discharge Medication List as of 5/31/2024  5:24 PM            BRIEF HPI   Patient information was obtained from: Patient   Use of Intrepreter: N/A     Radha Hansen is a 74 year old female who presents to the ED by car for evaluation of calf pain. Patient reports that one week ago she woke up and noticed her R foot was cold, discolored and tingling. She reports she has not have symptoms like this before.Patient otherwise denies fever, chills, nausea, vomiting, cough, SOB, chest pain, abdominal pain, diarrhea, constipation. Patient is not currently taking blood thinners.      REVIEW OF SYSTEMS:  Pertinent positive and negative symptoms per HPI.       MEDICAL HISTORY     Past Medical History:   Diagnosis Date    Congestive heart failure (H)     Coronary artery disease     Hyperlipidemia     Hypertension     Myocardial infarction (H)        Past Surgical History:   Procedure Laterality Date    CORONARY ANGIOGRAPHY ADULT ORDER      CV CORONARY ANGIOGRAM N/A 08/19/2022    Procedure: CV CORONARY ANGIOGRAM;  Surgeon: Neeraj Scales MD;  Location: [facility] CV    CV LEFT HEART CATH N/A 08/19/2022    Procedure: Left Heart Catheterization;  Surgeon: Neeraj Scales MD;  Location: [facility] CV    CV LEFT VENTRICULOGRAM N/A 08/19/2022    Procedure: Left  Ventriculogram;  Surgeon: Neeraj Scales MD;  Location: [facility] LAB CV       No family history on file.    Social History     Tobacco Use    Smoking status: Former     Types: Cigarettes    Smokeless tobacco: Never   Substance Use Topics    Alcohol use: Not Currently    Drug use: Not Currently       aspirin (ASA) 81 MG chewable tablet  atorvastatin (LIPITOR) 40 MG tablet  metoprolol succinate ER (TOPROL XL) 25 MG 24 hr tablet  nitroGLYcerin (NITROSTAT) 0.4 MG sublingual tablet  VITAMIN D-VITAMIN K PO          PHYSICAL EXAM     First Vitals:  Patient Vitals for the past 24 hrs:   BP Temp Temp src Pulse Resp SpO2 Weight   05/31/24 1619 -- -- -- 79 19 -- --   05/31/24 1615 (!) 155/70 -- -- -- -- -- --   05/31/24 1605 (!) 163/77 -- -- -- -- -- --   05/31/24 1449 (!) 167/70 97.5  F (36.4  C) Oral 90 16 95 % 69.6 kg (153 lb 6.4 oz)       PHYSICAL EXAM:  Physical Exam  Vitals reviewed.   Constitutional:       Appearance: Normal appearance. She is normal weight.   Eyes:      Pupils: Pupils are equal, round, and reactive to light.   Cardiovascular:      Rate and Rhythm: Normal rate and regular rhythm.      Heart sounds: Normal heart sounds.      Comments: Dorsalis pedis and posterior tibialis pulses 1+ on R foot, 2+ on L foot. Obvious large varicose veins in R proximal calf.   Pulmonary:      Effort: Pulmonary effort is normal. No respiratory distress.      Breath sounds: Normal breath sounds. No wheezing.   Musculoskeletal:         General: No swelling. Normal range of motion.      Comments: Mild tenderness to R calf.    Skin:     General: Skin is warm and dry.      Coloration: Skin is not pale.      Comments: Skin of bilateral lower extremities without hyperpigmentation or hair loss.   Neurological:      General: No focal deficit present.      Mental Status: She is alert and oriented to person, place, and time.      Sensory: No sensory deficit.      Motor: No weakness.      Gait: Gait normal.          RESULTS      LAB:  All pertinent labs reviewed and interpreted  Labs Ordered and Resulted from Time of ED Arrival to Time of ED Departure   INR - Abnormal       Result Value    INR 1.19 (*)    PARTIAL THROMBOPLASTIN TIME - Normal    aPTT 27     CBC WITH PLATELETS AND DIFFERENTIAL    WBC Count 8.8      RBC Count 5.09      Hemoglobin 15.4      Hematocrit 46.1      MCV 91      MCH 30.3      MCHC 33.4      RDW 14.4      Platelet Count 203      % Neutrophils 55      % Lymphocytes 27      % Monocytes 13      % Eosinophils 3      % Basophils 1      % Immature Granulocytes 1      NRBCs per 100 WBC 0      Absolute Neutrophils 5.0      Absolute Lymphocytes 2.4      Absolute Monocytes 1.1      Absolute Eosinophils 0.2      Absolute Basophils 0.1      Absolute Immature Granulocytes 0.0      Absolute NRBCs 0.0         RADIOLOGY:  US Lower Extremity Venous Duplex Right   Final Result   IMPRESSION:   1.  No deep venous thrombosis in the right lower extremity.   2.  Prominent varicosities along the medial distal right thigh and medial proximal right calf.          ECG:  Performed at: 5/31/24 at 1619    Impression: NSR, 83 bpm, biatrial enlargement, LAD, LV hypertrophy, no acute ST elevation or hyperacute T waves    Rate: 83 bpm  Rhythm: Sinus  Axis: Left axis deviation  NE Interval: 144 ms  QRS Interval: 94 ms  QTc Interval: 502 ms  ST Changes: None  Comparison: When compared to ECG from 8/19/22, criteria for inferior infarct are no longer present, questionable changes in initial forces of anterior leads, T wave amplitude has increased in inferior leads.    EKG results reviewed and interpreted by Dr. Dougherty, ED MD and Malena Kaiser PA-C.         Malena Kaiser PA-C  Emergency Medicine   [facility] EMERGENCY DEPARTMENT       Malena Kaiser PA-C  05/31/24 2939

## 2024-06-03 ENCOUNTER — TELEPHONE (OUTPATIENT)
Dept: VASCULAR SURGERY | Facility: CLINIC | Age: 74
End: 2024-06-03
Payer: MEDICARE

## 2024-06-03 DIAGNOSIS — M79.604 PAIN OF RIGHT LOWER EXTREMITY: Primary | ICD-10-CM

## 2024-06-03 NOTE — TELEPHONE ENCOUNTER
Vascular Referral Intake    Referred by: Malena WHITTINGTON for Cold foot, right calf pain    Specialty: Vascular Medicine    Specific Provider if Necessary:  Dr. Perry or Tarah    Visit Type: Vascular Medicine    Time Frame: Next Available    Testing/Imaging Needed Before Consult: US SIM's with exercise    Appt Note: (to be pasted into appt comments, also add where additional information is located, ie, outside images pushed to PACS, in Epic, sent to HIMS, etc)  ED on 5/31 for right calf pain, negative DVT.

## 2024-06-03 NOTE — TELEPHONE ENCOUNTER
Spoke with patient, she had many questions about how the process works for seeing the doctor.  Writer let her know that we are needing an ultrasound (this is different that what was done at the ED) and then we would have her see the provider.  Writer offered her consult in July however she stated this was to far in the future and she is going to call other vascular clinics to see if she can be seen sooner.

## 2024-06-10 LAB
ATRIAL RATE - MUSE: 83 BPM
DIASTOLIC BLOOD PRESSURE - MUSE: 70 MMHG
INTERPRETATION ECG - MUSE: NORMAL
P AXIS - MUSE: 66 DEGREES
PR INTERVAL - MUSE: 144 MS
QRS DURATION - MUSE: 94 MS
QT - MUSE: 428 MS
QTC - MUSE: 502 MS
R AXIS - MUSE: -43 DEGREES
SYSTOLIC BLOOD PRESSURE - MUSE: 155 MMHG
T AXIS - MUSE: 84 DEGREES
VENTRICULAR RATE- MUSE: 83 BPM

## 2024-07-01 ENCOUNTER — OFFICE VISIT (OUTPATIENT)
Dept: CARDIOLOGY | Facility: CLINIC | Age: 74
End: 2024-07-01
Payer: MEDICARE

## 2024-07-01 VITALS
HEART RATE: 78 BPM | BODY MASS INDEX: 23.39 KG/M2 | WEIGHT: 149 LBS | SYSTOLIC BLOOD PRESSURE: 162 MMHG | HEIGHT: 67 IN | DIASTOLIC BLOOD PRESSURE: 73 MMHG | RESPIRATION RATE: 14 BRPM

## 2024-07-01 DIAGNOSIS — I25.10 CORONARY ARTERY DISEASE INVOLVING NATIVE CORONARY ARTERY OF NATIVE HEART WITHOUT ANGINA PECTORIS: ICD-10-CM

## 2024-07-01 DIAGNOSIS — E78.2 MIXED HYPERLIPIDEMIA: ICD-10-CM

## 2024-07-01 DIAGNOSIS — I10 BENIGN ESSENTIAL HYPERTENSION: ICD-10-CM

## 2024-07-01 DIAGNOSIS — I25.5 ISCHEMIC CARDIOMYOPATHY: Primary | ICD-10-CM

## 2024-07-01 DIAGNOSIS — I50.20 HEART FAILURE WITH REDUCED EJECTION FRACTION (H): ICD-10-CM

## 2024-07-01 PROCEDURE — 99214 OFFICE O/P EST MOD 30 MIN: CPT | Performed by: INTERNAL MEDICINE

## 2024-07-01 PROCEDURE — G2211 COMPLEX E/M VISIT ADD ON: HCPCS | Performed by: INTERNAL MEDICINE

## 2024-07-01 RX ORDER — LOSARTAN POTASSIUM 25 MG/1
12.5 TABLET ORAL DAILY
Qty: 45 TABLET | Refills: 3 | Status: SHIPPED | OUTPATIENT
Start: 2024-07-01 | End: 2024-07-12

## 2024-07-01 NOTE — PATIENT INSTRUCTIONS
Please contact direct nurses line Monday through Friday 8 AM to 5 PM @ [phone]    After-hours contact cardiology office at [phone].    Plan:    Start losartan 12.5 mg daily  Complete echo  Obtain labs on day of echo ([facility] lab).   Will maykel to start metoprolol and statin (cholesterol lower medication).

## 2024-07-01 NOTE — PROGRESS NOTES
HEART CARE ENCOUNTER CONSULTATON NOTE      [facility]  [phone]      Assessment/Recommendations   Assessment:   Heart failure with reduced ejection fraction, LVEF: 38% (MRI, 2022).  Moderate dilated LV.    Coronary Artery Disease, occluded mid LAD, proximal RCA disease  History of Myocardial Infarction with total occlusion of mid LAD (near transmural infarction anterior wall).  Peripheral arterial disease with right lower extremity resting claudication pain  Hyperlipidemia, LDL: 155.    Hypertension  6.  Cardiomegaly on CT scan    Plan:   Start losartan 12.5 mg daily  Complete echo to assess for LV thrombus and left ventricular systolic function.  Obtain labs on day of echo ([facility] lab).   Will maykel to start metoprolol and statin (cholesterol lower medication).    Continue aspirin       History of Present Illness/Subjective    HPI: Radha Hansen is a 74 year old female prior history of coronary artery disease, occluded LAD, ischemic cardiomyopathy, heart failure with reduced ejection fraction presents cardiology clinic to reestablish care.    Patient diagnosed with ischemic cardiomyopathy in 2022.  That time she was noted to have occluded LAD and significant wall motion abnormality in the mid to distal anterior wall.  She followed up 1 visit after her initial hospitalization for congestive heart failure.    Currently she denies any significant dyspnea on exertion.  She has mild lower extremity edema.  Was recently evaluated vascular surgery for claudication symptoms.  There is concern for right-sided peripheral vascular disease which require surgical intervention.  Also possible arterial clot.    Patient needs complete echocardiogram to evaluate for LV thrombus and possible progression in her cardiomyopathy.  She may need further testing such as stress testing.  Will initiate losartan today.  Discussed starting metoprolol.  She cannot afford Entresto which was started in the  past.  She is not on statin therapy but would be willing to start in the future.  Will check lipids.    ECG: May 2024: Sinus rhythm, heart rate 83 bpm.  QRS duration 94 ms.  QTc interval 500 ms.  Evidence of left ventricular hypertrophy.  Left axis deviation.  Anteroseptal infarct pattern.  T wave inversions slightly worse in the lateral leads.  Compared to prior in 2022.    Cardiac MRI: 2022  .  Left ventricular size is moderately enlarged. Wall thickness is normal.  Systolic function is  moderately reduced with [id number] anterior, anteroseptal, and apical akinesis. The quantified left  ventricular ejection fraction is 38%.   2.  Normal right ventricular size and systolic function.  The quantified right ventricular ejection  fraction is 66%.   3. There is a moderate-sized area of near transmural scar in the [id number] anterior, anteroseptal, and  apical walls of the left ventricle suggestive of prior myocardial infarction in the territory of the left  anterior descending artery. These wall segments have a low probability of viability. No significant scar  seen in the territories of the right coronary or left circumflex arteries.  4.  Mild left atrial enlargement.  5.  No significant valvular abnormalities.    Coronary Angiogram: 2022  Mid LAD lesion is 100% stenosed. Distal vessel weaky supported fromDiag 1 collaterals  Prox RCA to Mid RCA lesion is 20% stenosed.    Echocardiogram Results: 2022  The left atrium is mildly dilated.  There is borderline concentric left ventricular hypertrophy.  The visual ejection fraction is 15-20%.  Diastolic Doppler findings (E/E' ratio and/or other parameters) suggest left  ventricular filling pressures are increased.  There is anterior, septal, and apical wall akinesis.  There is inferior wall akinesis.  Mildly decreased right ventricular systolic function  No significant valve abnormality.     Physical Examination  Review of Systems   Vitals: BP (!) 162/73 (BP Location:  "Left arm, Patient Position: Sitting, Cuff Size: Adult Regular)   Pulse 78   Resp 14   Ht 1.702 m (5' 7\")   Wt 67.6 kg (149 lb)   BMI 23.34 kg/m    BMI= Body mass index is 23.34 kg/m .  Wt Readings from Last 3 Encounters:   07/01/24 67.6 kg (149 lb)   05/31/24 69.6 kg (153 lb 6.4 oz)   10/11/23 70.3 kg (155 lb)        Pleasant   ENT/Mouth: membranes moist, no oral lesions or bleeding gums.      EYES:  no scleral icterus, normal conjunctivae       Chest/Lungs:   lungs are clear to auscultation, no rales or wheezing, no sternal scar, equal chest wall expansion    Cardiovascular:   Regular. Normal first and second heart sounds with early peaking systolic murmurs, rubs, or gallops; the carotid, Jugular venous pressure normal, trace edema bilaterally    Abdomen:  no tenderness; bowel sounds are present   Extremities: no cyanosis or clubbing   Skin: no xanthelasma, warm.    Neurologic: normal  bilateral, no tremors     Psychiatric: alert and oriented x3, calm        Please refer above for cardiac ROS details.        Medical History  Surgical History Family History Social History   Past Medical History:   Diagnosis Date    Congestive heart failure (H)     Coronary artery disease     Hyperlipidemia     Hypertension     Myocardial infarction (H)      Past Surgical History:   Procedure Laterality Date    CORONARY ANGIOGRAPHY ADULT ORDER      CV CORONARY ANGIOGRAM N/A 08/19/2022    Procedure: CV CORONARY ANGIOGRAM;  Surgeon: Neeraj Scales MD;  Location: [facility] CATH [facility] CV    CV LEFT HEART CATH N/A 08/19/2022    Procedure: Left Heart Catheterization;  Surgeon: Neeraj Scales MD;  Location: [facility] CV    CV LEFT VENTRICULOGRAM N/A 08/19/2022    Procedure: Left Ventriculogram;  Surgeon: Neeraj Scales MD;  Location: [facility] CATH LAB CV     No family history on file.     Social History     Socioeconomic History    Marital status: Single     Spouse name: Not on file    Number of children: Not on " "file    Years of education: Not on file    Highest education level: Not on file   Occupational History    Not on file   Tobacco Use    Smoking status: Former     Types: Cigarettes    Smokeless tobacco: Never   Substance and Sexual Activity    Alcohol use: Not Currently    Drug use: Not Currently    Sexual activity: Not Currently   Other Topics Concern    Not on file   Social History Narrative    Not on file     Social Determinants of Health     Financial Resource Strain: Not on file   Food Insecurity: Not on file   Transportation Needs: Not on file   Physical Activity: Not on file   Stress: Not on file   Social Connections: Not on file   Interpersonal Safety: Not on file   Housing Stability: Not on file           Medications  Allergies   Current Outpatient Medications   Medication Sig Dispense Refill    aspirin (ASA) 81 MG chewable tablet Take 1 tablet (81 mg) by mouth daily 90 tablet 3    atorvastatin (LIPITOR) 40 MG tablet Take 1 tablet (40 mg) by mouth daily 90 tablet 1    metoprolol succinate ER (TOPROL XL) 25 MG 24 hr tablet Take 1 tablet (25 mg) by mouth daily 90 tablet 1    nitroGLYcerin (NITROSTAT) 0.4 MG sublingual tablet For chest pain place 1 tablet under the tongue every 5 minutes for 3 doses. If symptoms persist 5 minutes after 1st dose call 911. 10 tablet 0    VITAMIN D-VITAMIN K PO Take 1 capsule by mouth daily Dr. Ibrahim's brand. Contains vitamin D3 10,000 units, vitamin K2 100mcg, zinc 20mg, & magnesium 25mg         Allergies   Allergen Reactions    Hydralazine Palpitations and Other (See Comments)     \"Pounding heart, throbbing all over, heart raising\"    Lisinopril     Penicillins Hives     Childhood reaction ~5-6 years old but reports accidentally prescribed penicillin recently and tolerated so think she may have grown out of the allergy           Lab Results    Chemistry/lipid CBC Cardiac Enzymes/BNP/TSH/INR   Recent Labs   Lab Test 09/20/22  1421   CHOL 220*   HDL 46*   *   TRIG 94 " "    Recent Labs   Lab Test 09/20/22  1421   *     Recent Labs   Lab Test 09/20/22  1421      POTASSIUM 4.2   CHLORIDE 107   CO2 22   GLC 97   BUN 15.7   CR 0.68   GFRESTIMATED >90   JENNIFFER 9.3     Recent Labs   Lab Test 09/20/22  1421 08/21/22  0431 08/19/22  0742   CR 0.68 0.65 0.80     No results for input(s): \"A1C\" in the last 05449 hours.       Recent Labs   Lab Test 05/31/24  1554   WBC 8.8   HGB 15.4   HCT 46.1   MCV 91        Recent Labs   Lab Test 05/31/24  1554 08/22/22  1533 08/21/22  0431   HGB 15.4 15.2 15.3    Recent Labs   Lab Test 08/18/22  2132 08/18/22  1509 08/18/22  0928   TROPONINI 5.09* 3.39* 0.88*     Recent Labs   Lab Test 08/18/22  0550   BNP 1,016*     No results for input(s): \"TSH\" in the last 33701 hours.  Recent Labs   Lab Test 05/31/24  1554 08/18/22  0550   INR 1.19* 1.13        Jeet Sanchez DO  Cardiologist       The longitudinal plan of care for the diagnosis(es)/condition(s) as documented were addressed during this visit. Due to the added complexity in care, I will continue to support Radha in the subsequent management and with ongoing continuity of care.  Patient require follow-up after echocardiogram and further titration of medications for reduction in left-ventricular function.  Will need longitudinal management of her ischemic cardiomyopathy, hyperlipidemia and coronary disease                         "

## 2024-07-01 NOTE — LETTER
7/1/2024    Physician No Ref-Primary  No address on file    RE: Radha Hansen       Dear Colleague,     I had the pleasure of seeing Radha Hansen in the [facility].    HEART CARE ENCOUNTER CONSULTATON NOTE      [facility]  [phone]      Assessment/Recommendations   Assessment:   Heart failure with reduced ejection fraction, LVEF: 38% (MRI, 2022).  Moderate dilated LV.    Coronary Artery Disease, occluded mid LAD, proximal RCA disease  History of Myocardial Infarction with total occlusion of mid LAD (near transmural infarction anterior wall).  Peripheral arterial disease with right lower extremity resting claudication pain  Hyperlipidemia, LDL: 155.    Hypertension  6.  Cardiomegaly on CT scan    Plan:   Start losartan 12.5 mg daily  Complete echo to assess for LV thrombus and left ventricular systolic function.  Obtain labs on day of echo ([facility] lab).   Will maykel to start metoprolol and statin (cholesterol lower medication).    Continue aspirin       History of Present Illness/Subjective    HPI: Radha Hansen is a 74 year old female prior history of coronary artery disease, occluded LAD, ischemic cardiomyopathy, heart failure with reduced ejection fraction presents cardiology clinic to reestablish care.    Patient diagnosed with ischemic cardiomyopathy in 2022.  That time she was noted to have occluded LAD and significant wall motion abnormality in the mid to distal anterior wall.  She followed up 1 visit after her initial hospitalization for congestive heart failure.    Currently she denies any significant dyspnea on exertion.  She has mild lower extremity edema.  Was recently evaluated vascular surgery for claudication symptoms.  There is concern for right-sided peripheral vascular disease which require surgical intervention.  Also possible arterial clot.    Patient needs complete echocardiogram to evaluate for LV thrombus and possible progression in her  cardiomyopathy.  She may need further testing such as stress testing.  Will initiate losartan today.  Discussed starting metoprolol.  She cannot afford Entresto which was started in the past.  She is not on statin therapy but would be willing to start in the future.  Will check lipids.    ECG: May 2024: Sinus rhythm, heart rate 83 bpm.  QRS duration 94 ms.  QTc interval 500 ms.  Evidence of left ventricular hypertrophy.  Left axis deviation.  Anteroseptal infarct pattern.  T wave inversions slightly worse in the lateral leads.  Compared to prior in 2022.    Cardiac MRI: 2022  .  Left ventricular size is moderately enlarged. Wall thickness is normal.  Systolic function is  moderately reduced with [id number] anterior, anteroseptal, and apical akinesis. The quantified left  ventricular ejection fraction is 38%.   2.  Normal right ventricular size and systolic function.  The quantified right ventricular ejection  fraction is 66%.   3. There is a moderate-sized area of near transmural scar in the [id number] anterior, anteroseptal, and  apical walls of the left ventricle suggestive of prior myocardial infarction in the territory of the left  anterior descending artery. These wall segments have a low probability of viability. No significant scar  seen in the territories of the right coronary or left circumflex arteries.  4.  Mild left atrial enlargement.  5.  No significant valvular abnormalities.    Coronary Angiogram: 2022  Mid LAD lesion is 100% stenosed. Distal vessel weaky supported fromDiag 1 collaterals  Prox RCA to Mid RCA lesion is 20% stenosed.    Echocardiogram Results: 2022  The left atrium is mildly dilated.  There is borderline concentric left ventricular hypertrophy.  The visual ejection fraction is 15-20%.  Diastolic Doppler findings (E/E' ratio and/or other parameters) suggest left  ventricular filling pressures are increased.  There is anterior, septal, and apical wall akinesis.  There is inferior  "wall akinesis.  Mildly decreased right ventricular systolic function  No significant valve abnormality.     Physical Examination  Review of Systems   Vitals: BP (!) 162/73 (BP Location: Left arm, Patient Position: Sitting, Cuff Size: Adult Regular)   Pulse 78   Resp 14   Ht 1.702 m (5' 7\")   Wt 67.6 kg (149 lb)   BMI 23.34 kg/m    BMI= Body mass index is 23.34 kg/m .  Wt Readings from Last 3 Encounters:   07/01/24 67.6 kg (149 lb)   05/31/24 69.6 kg (153 lb 6.4 oz)   10/11/23 70.3 kg (155 lb)        Pleasant   ENT/Mouth: membranes moist, no oral lesions or bleeding gums.      EYES:  no scleral icterus, normal conjunctivae       Chest/Lungs:   lungs are clear to auscultation, no rales or wheezing, no sternal scar, equal chest wall expansion    Cardiovascular:   Regular. Normal first and second heart sounds with early peaking systolic murmurs, rubs, or gallops; the carotid, Jugular venous pressure normal, trace edema bilaterally    Abdomen:  no tenderness; bowel sounds are present   Extremities: no cyanosis or clubbing   Skin: no xanthelasma, warm.    Neurologic: normal  bilateral, no tremors     Psychiatric: alert and oriented x3, calm        Please refer above for cardiac ROS details.        Medical History  Surgical History Family History Social History   Past Medical History:   Diagnosis Date    Congestive heart failure (H)     Coronary artery disease     Hyperlipidemia     Hypertension     Myocardial infarction (H)      Past Surgical History:   Procedure Laterality Date    CORONARY ANGIOGRAPHY ADULT ORDER      CV CORONARY ANGIOGRAM N/A 08/19/2022    Procedure: CV CORONARY ANGIOGRAM;  Surgeon: Neeraj Scales MD;  Location: [facility] CATH [facility] CV    CV LEFT HEART CATH N/A 08/19/2022    Procedure: Left Heart Catheterization;  Surgeon: Neeraj Scales MD;  Location: [facility] CATH LAB CV    CV LEFT VENTRICULOGRAM N/A 08/19/2022    Procedure: Left Ventriculogram;  Surgeon: Neeraj Scales MD;  " "Location: [facility] LAB CV     No family history on file.     Social History     Socioeconomic History    Marital status: Single     Spouse name: Not on file    Number of children: Not on file    Years of education: Not on file    Highest education level: Not on file   Occupational History    Not on file   Tobacco Use    Smoking status: Former     Types: Cigarettes    Smokeless tobacco: Never   Substance and Sexual Activity    Alcohol use: Not Currently    Drug use: Not Currently    Sexual activity: Not Currently   Other Topics Concern    Not on file   Social History Narrative    Not on file     Social Determinants of Health     Financial Resource Strain: Not on file   Food Insecurity: Not on file   Transportation Needs: Not on file   Physical Activity: Not on file   Stress: Not on file   Social Connections: Not on file   Interpersonal Safety: Not on file   Housing Stability: Not on file           Medications  Allergies   Current Outpatient Medications   Medication Sig Dispense Refill    aspirin (ASA) 81 MG chewable tablet Take 1 tablet (81 mg) by mouth daily 90 tablet 3    atorvastatin (LIPITOR) 40 MG tablet Take 1 tablet (40 mg) by mouth daily 90 tablet 1    metoprolol succinate ER (TOPROL XL) 25 MG 24 hr tablet Take 1 tablet (25 mg) by mouth daily 90 tablet 1    nitroGLYcerin (NITROSTAT) 0.4 MG sublingual tablet For chest pain place 1 tablet under the tongue every 5 minutes for 3 doses. If symptoms persist 5 minutes after 1st dose call 911. 10 tablet 0    VITAMIN D-VITAMIN K PO Take 1 capsule by mouth daily Dr. Ibrahim's brand. Contains vitamin D3 10,000 units, vitamin K2 100mcg, zinc 20mg, & magnesium 25mg         Allergies   Allergen Reactions    Hydralazine Palpitations and Other (See Comments)     \"Pounding heart, throbbing all over, heart raising\"    Lisinopril     Penicillins Hives     Childhood reaction ~5-6 years old but reports accidentally prescribed penicillin recently and tolerated so think she may " "have grown out of the allergy           Lab Results    Chemistry/lipid CBC Cardiac Enzymes/BNP/TSH/INR   Recent Labs   Lab Test 09/20/22  1421   CHOL 220*   HDL 46*   *   TRIG 94     Recent Labs   Lab Test 09/20/22  1421   *     Recent Labs   Lab Test 09/20/22  1421      POTASSIUM 4.2   CHLORIDE 107   CO2 22   GLC 97   BUN 15.7   CR 0.68   GFRESTIMATED >90   JENNIFFER 9.3     Recent Labs   Lab Test 09/20/22  1421 08/21/22  0431 08/19/22  0742   CR 0.68 0.65 0.80     No results for input(s): \"A1C\" in the last 19789 hours.       Recent Labs   Lab Test 05/31/24  1554   WBC 8.8   HGB 15.4   HCT 46.1   MCV 91        Recent Labs   Lab Test 05/31/24  1554 08/22/22  1533 08/21/22  0431   HGB 15.4 15.2 15.3    Recent Labs   Lab Test 08/18/22  2132 08/18/22  1509 08/18/22  0928   TROPONINI 5.09* 3.39* 0.88*     Recent Labs   Lab Test 08/18/22  0550   BNP 1,016*     No results for input(s): \"TSH\" in the last 14476 hours.  Recent Labs   Lab Test 05/31/24  1554 08/18/22  0550   INR 1.19* 1.13        Jeet Sanchez DO  Cardiologist       The longitudinal plan of care for the diagnosis(es)/condition(s) as documented were addressed during this visit. Due to the added complexity in care, I will continue to support Radha in the subsequent management and with ongoing continuity of care.  Patient require follow-up after echocardiogram and further titration of medications for reduction in left-ventricular function.  Will need longitudinal management of her ischemic cardiomyopathy, hyperlipidemia and coronary disease      Thank you for allowing me to participate in the care of your patient.      Sincerely,     Jeet Sanchez DO     [facility]  cc:   Adriano Chapman MD  [facility]  [address] [place] [id number]      "

## 2024-07-09 ENCOUNTER — LAB (OUTPATIENT)
Dept: CARDIOLOGY | Facility: CLINIC | Age: 74
End: 2024-07-09
Payer: MEDICARE

## 2024-07-09 ENCOUNTER — HOSPITAL ENCOUNTER (OUTPATIENT)
Dept: CARDIOLOGY | Facility: CLINIC | Age: 74
Discharge: HOME | End: 2024-07-09
Attending: INTERNAL MEDICINE | Admitting: INTERNAL MEDICINE
Payer: MEDICARE

## 2024-07-09 DIAGNOSIS — I25.10 CORONARY ARTERY DISEASE INVOLVING NATIVE CORONARY ARTERY OF NATIVE HEART WITHOUT ANGINA PECTORIS: ICD-10-CM

## 2024-07-09 DIAGNOSIS — I25.5 ISCHEMIC CARDIOMYOPATHY: ICD-10-CM

## 2024-07-09 DIAGNOSIS — I50.20 HEART FAILURE WITH REDUCED EJECTION FRACTION (H): ICD-10-CM

## 2024-07-09 LAB
ANION GAP SERPL CALCULATED.3IONS-SCNC: 11 MMOL/L (ref 7–15)
BUN SERPL-MCNC: 18.6 MG/DL (ref 8–23)
CALCIUM SERPL-MCNC: 9.4 MG/DL (ref 8.8–10.2)
CHLORIDE SERPL-SCNC: 105 MMOL/L (ref 98–107)
CHOLEST SERPL-MCNC: 267 MG/DL
CREAT SERPL-MCNC: 0.74 MG/DL (ref 0.51–0.95)
DEPRECATED HCO3 PLAS-SCNC: 27 MMOL/L (ref 22–29)
EGFRCR SERPLBLD CKD-EPI 2021: 84 ML/MIN/1.73M2
FASTING STATUS PATIENT QL REPORTED: YES
GLUCOSE SERPL-MCNC: 96 MG/DL (ref 70–99)
HDLC SERPL-MCNC: 53 MG/DL
LDLC SERPL CALC-MCNC: 197 MG/DL
LVEF ECHO: NORMAL
NONHDLC SERPL-MCNC: 214 MG/DL
NT-PROBNP SERPL-MCNC: 2436 PG/ML (ref 0–900)
POTASSIUM SERPL-SCNC: 4.2 MMOL/L (ref 3.4–5.3)
SODIUM SERPL-SCNC: 143 MMOL/L (ref 135–145)
TRIGL SERPL-MCNC: 87 MG/DL

## 2024-07-09 PROCEDURE — 80048 BASIC METABOLIC PNL TOTAL CA: CPT

## 2024-07-09 PROCEDURE — 80061 LIPID PANEL: CPT

## 2024-07-09 PROCEDURE — 83880 ASSAY OF NATRIURETIC PEPTIDE: CPT

## 2024-07-09 PROCEDURE — 36415 COLL VENOUS BLD VENIPUNCTURE: CPT

## 2024-07-09 PROCEDURE — C8929 TTE W OR WO FOL WCON,DOPPLER: HCPCS

## 2024-07-09 PROCEDURE — 255N000002 HC RX 255 OP 636: Performed by: INTERNAL MEDICINE

## 2024-07-09 PROCEDURE — 93306 TTE W/DOPPLER COMPLETE: CPT | Mod: 26 | Performed by: INTERNAL MEDICINE

## 2024-07-09 RX ADMIN — PERFLUTREN 3 ML: 6.52 INJECTION, SUSPENSION INTRAVENOUS at 10:35

## 2024-07-10 DIAGNOSIS — I25.10 CORONARY ARTERY DISEASE INVOLVING NATIVE CORONARY ARTERY OF NATIVE HEART WITHOUT ANGINA PECTORIS: ICD-10-CM

## 2024-07-10 DIAGNOSIS — I50.20 HEART FAILURE WITH REDUCED EJECTION FRACTION (H): Primary | ICD-10-CM

## 2024-07-10 RX ORDER — METOPROLOL SUCCINATE 25 MG/1
25 TABLET, EXTENDED RELEASE ORAL DAILY
Qty: 90 TABLET | Refills: 1 | Status: SHIPPED | OUTPATIENT
Start: 2024-07-10

## 2024-07-10 RX ORDER — ATORVASTATIN CALCIUM 40 MG/1
40 TABLET, FILM COATED ORAL DAILY
Qty: 90 TABLET | Refills: 1 | Status: SHIPPED | OUTPATIENT
Start: 2024-07-10

## 2024-07-10 NOTE — RESULT ENCOUNTER NOTE
Please inform patient that echo demonstrated decline in LV function (patient was off HF medications).  Patient need to restart metoprolol XL at 25 mg daily.  Continue losartan.  Need HF NAVEEN visit 2-3 weeks.

## 2024-07-11 NOTE — PROGRESS NOTES
Assessment/Recommendations   Assessment:    1.  Heart failure with Reduced Ejection Fraction with LVEF 38% per cardiac MRI in 2022 with moderate dilated left ventricle/Ischemic Cardiomyopathy:    Echocardiogram on 7/9/2024 showed LVEF severely reduced to 25 to 30% with moderate to severe global hypokinesia with apical akinesia.  No significant valve disease or pericardial effusion noted.  Decline in LVEF likely due to being off of the heart failure medications.      Current weight is 145-147 lbs    On low salt diet< 2000 mg per day    Heart failure regimen includes:    -Beta-blocker therapy with metoprolol succinate 25 mg daily-not started    -ARB therapy with Losartan 12.5 mg daily    -Not on Aldosterone blocker therapy    -Not on SGLT2 Inhibitor with     We discussed and reviewed about heart failure, medication management, and lifestyle management including low sodium diet <2 g/day, daily weight, and staying physically active as tolerated.     2. Coronary artery disease with occluded mid LAD, proximal RCA: History of MI with total occlusion of mid LAD.  Cardiac MRI August 2022-MRI showed low probability of viability.  On aspirin and atorvastatin.    No chest pain.    3.  Dyslipidemia: On atorvastatin 40 mg daily not started yet.-    4.  Hypertension: Blood pressure today is 160/64.    She reports her home blood pressure runs systolic in 160s in the morning and 116 in the evening.  She is asymptomatic.    Diastolic usually runs in 65 to 75    5.  Peripheral artery disease with right lower extremity claudication: Patient was recommended revascularization which is pending.    Plan/Recommendation:  -Increase losartan from 12.5 mg to 25 mg daily  -Start metoprolol succinate 25 mg daily   -Schedule BMP in a week  -Monitor blood pressure and heart rate and keep records  -Will continue to work on uptitrate of her GDMT as tolerated  -Continue on low-sodium diet <2000 mg per day, daily weight monitoring, and  "maintain fluid intake at 50 to 60 ounces per day    Follow up with Dale in 4-8 weeks. Follow up with Dr. Sanchez in 4 months     The longitudinal plan of care for ischemic cardiomyopathy, heart failure with severely reduced ejection fraction, hypertension, dyslipidemia, coronary artery disease was addressed during this visit.?Due to the added   complexity in care, I will continue to support Radha Hansen in the subsequent management of this   condition(s) and with the ongoing continuity of care of this condition(s)\".     History of Present Illness/Subjective    Ms. Radha Hansen is a 74 year old female with a past medical history of peripheral vascular disease, coronary artery disease with occluded LAD, ischemic cardiomyopathy heart failure with reduced ejection fraction diagnosis in 2022, who is seen at [facility] for continue heart failure follow up.    Patient was last seen my colleague in heart failure clinic in September 2022.  She recently saw Dr. Sanchez to  reestablish care.  She was resumed on losartan.    Today, Radha reports she has been taking losartan with no adverse effect.  She has not picked up the prescription for metoprolol and atorvastatin. She denies fatigue, lightheadedness, shortness of breath, dyspnea on exertion, orthopnea, PND, palpitations, chest pain, and abdominal fullness/bloating.      She states she stopped taking all her medications due to issue with cost.    She reports following low-sodium diet.  She reports adequate fluid intake.    ECHO on 7/9/24-Reviewed:   Interpretation Summary   The left ventricle is borderline dilated.  Left ventricular function is decreased. The ejection fraction is 25-30%  (severely reduced).  Moderate to severe global hypokinesia with apical akinesia.  Normal right ventricle size and systolic function.  IVC diameter <2.1 cm collapsing >50% with sniff suggests a normal RA pressure  of 3 mmHg.  No hemodynamically " "significant valvular abnormalities on 2D or color flow  imaging.     Physical Examination Review of Systems   BP (!) 160/64 (BP Location: Right arm, Patient Position: Sitting, Cuff Size: Adult Regular)   Pulse 69   Resp 15   Ht 1.702 m (5' 7\")   Wt 67.6 kg (149 lb)   BMI 23.34 kg/m    Body mass index is 23.34 kg/m .  Wt Readings from Last 3 Encounters:   07/12/24 67.6 kg (149 lb)   07/01/24 67.6 kg (149 lb)   05/31/24 69.6 kg (153 lb 6.4 oz)     General Appearance:   no distress, normal body habitus   ENT/Mouth: membranes moist, no oral lesions or bleeding gums.      EYES:  no scleral icterus, normal conjunctivae   Neck: no carotid bruits or thyromegaly   Chest/Lungs:   lungs are clear to auscultation, no rales or wheezing, equal chest wall expansion    Cardiovascular:   Heart rate regular. Normal first and second heart sounds with no murmurs, rubs, or gallops; Jugular venous pressure flat, mild pedal edema bilaterally    Abdomen:  no organomegaly, masses, bruits, or tenderness; bowel sounds are present   Extremities   no cyanosis or clubbing    CMS intact.   Skin: no xanthelasma, warm.    Neurologic: Alert and oriented X 3 no tremors   Psychiatric: calm and cooperative                                                   Negative unless noted in HPI     Medical History  Surgical History Family History Social History   Past Medical History:   Diagnosis Date    Congestive heart failure (H)     Coronary artery disease     Hyperlipidemia     Hypertension     Myocardial infarction (H)     Pulmonary edema 08/18/2022    Past Surgical History:   Procedure Laterality Date    CORONARY ANGIOGRAPHY ADULT ORDER      CV CORONARY ANGIOGRAM N/A 08/19/2022    Procedure: CV CORONARY ANGIOGRAM;  Surgeon: Neeraj Scales MD;  Location: [facility] CATH [facility] CV    CV LEFT HEART CATH N/A 08/19/2022    Procedure: Left Heart Catheterization;  Surgeon: Neeraj Scales MD;  Location: [facility] CATH [facility] CV    CV LEFT VENTRICULOGRAM N/A " 08/19/2022    Procedure: Left Ventriculogram;  Surgeon: Neeraj Scales MD;  Location: [facility] LAB CV    No family history on file. Social History     Socioeconomic History    Marital status: Single     Spouse name: Not on file    Number of children: Not on file    Years of education: Not on file    Highest education level: Not on file   Occupational History    Not on file   Tobacco Use    Smoking status: Former     Types: Cigarettes    Smokeless tobacco: Never   Substance and Sexual Activity    Alcohol use: Not Currently    Drug use: Not Currently    Sexual activity: Not Currently   Other Topics Concern    Not on file   Social History Narrative    Not on file     Social Determinants of Health     Financial Resource Strain: Not on file   Food Insecurity: Not on file   Transportation Needs: Not on file   Physical Activity: Not on file   Stress: Not on file   Social Connections: Not on file   Interpersonal Safety: Not on file   Housing Stability: Not on file          Medications  Allergies   Current Outpatient Medications   Medication Sig Dispense Refill    aspirin (ASA) 81 MG chewable tablet Take 1 tablet (81 mg) by mouth daily 90 tablet 3    losartan (COZAAR) 25 MG tablet Take 1 tablet (25 mg) by mouth daily 90 tablet 3    VITAMIN D-VITAMIN K PO Take 1 capsule by mouth daily Dr. Ibrahim's brand. Contains vitamin D3 10,000 units, vitamin K2 100mcg, K7, zinc 20mg, & magnesium 25mg      atorvastatin (LIPITOR) 40 MG tablet Take 1 tablet (40 mg) by mouth daily (Patient not taking: Reported on 7/12/2024) 90 tablet 1    metoprolol succinate ER (TOPROL XL) 25 MG 24 hr tablet Take 1 tablet (25 mg) by mouth daily (Patient not taking: Reported on 7/12/2024) 90 tablet 1    nitroGLYcerin (NITROSTAT) 0.4 MG sublingual tablet For chest pain place 1 tablet under the tongue every 5 minutes for 3 doses. If symptoms persist 5 minutes after 1st dose call 911. (Patient not taking: Reported on 7/12/2024) 10 tablet 0    Allergies  "  Allergen Reactions    Hydralazine Palpitations and Other (See Comments)     \"Pounding heart, throbbing all over, heart raising\"    Penicillins Hives     Childhood reaction ~5-6 years old but reports accidentally prescribed penicillin recently and tolerated so think she may have grown out of the allergy     Lisinopril GI Disturbance         Lab Results    Chemistry/lipid CBC Cardiac Enzymes/BNP/TSH/INR   Lab Results   Component Value Date    CHOL 267 (H) 07/09/2024    HDL 53 07/09/2024    TRIG 87 07/09/2024    BUN 18.6 07/09/2024     07/09/2024    CO2 27 07/09/2024    Lab Results   Component Value Date    WBC 8.8 05/31/2024    HGB 15.4 05/31/2024    HCT 46.1 05/31/2024    MCV 91 05/31/2024     05/31/2024    Lab Results   Component Value Date    TROPONINI 5.09 (HH) 08/18/2022    BNP 1,016 (H) 08/18/2022    INR 1.19 (H) 05/31/2024        50  minutes spent on the date of encounter doing chart review, review of test results, interpretation with above tests, patient visit, and documentation.        This note has been dictated using voice recognition software. Any grammatical, typographical, or context distortions are unintentional and inherent to the software        "

## 2024-07-12 ENCOUNTER — OFFICE VISIT (OUTPATIENT)
Dept: CARDIOLOGY | Facility: CLINIC | Age: 74
End: 2024-07-12
Payer: MEDICARE

## 2024-07-12 VITALS
HEART RATE: 69 BPM | RESPIRATION RATE: 15 BRPM | HEIGHT: 67 IN | DIASTOLIC BLOOD PRESSURE: 64 MMHG | SYSTOLIC BLOOD PRESSURE: 160 MMHG | BODY MASS INDEX: 23.39 KG/M2 | WEIGHT: 149 LBS

## 2024-07-12 DIAGNOSIS — I25.5 ISCHEMIC CARDIOMYOPATHY: ICD-10-CM

## 2024-07-12 DIAGNOSIS — I10 BENIGN ESSENTIAL HYPERTENSION: ICD-10-CM

## 2024-07-12 DIAGNOSIS — I50.20 HEART FAILURE WITH REDUCED EJECTION FRACTION (H): Primary | ICD-10-CM

## 2024-07-12 DIAGNOSIS — E78.2 MIXED HYPERLIPIDEMIA: ICD-10-CM

## 2024-07-12 DIAGNOSIS — I25.10 CORONARY ARTERY DISEASE INVOLVING NATIVE CORONARY ARTERY OF NATIVE HEART WITHOUT ANGINA PECTORIS: ICD-10-CM

## 2024-07-12 DIAGNOSIS — I73.9 PAD (PERIPHERAL ARTERY DISEASE) (H): ICD-10-CM

## 2024-07-12 PROBLEM — J81.1 PULMONARY EDEMA: Status: RESOLVED | Noted: 2022-08-18 | Resolved: 2024-07-12

## 2024-07-12 PROCEDURE — 99215 OFFICE O/P EST HI 40 MIN: CPT | Performed by: NURSE PRACTITIONER

## 2024-07-12 PROCEDURE — G2211 COMPLEX E/M VISIT ADD ON: HCPCS | Performed by: NURSE PRACTITIONER

## 2024-07-12 RX ORDER — LOSARTAN POTASSIUM 25 MG/1
25 TABLET ORAL DAILY
Qty: 90 TABLET | Refills: 3 | Status: SHIPPED | OUTPATIENT
Start: 2024-07-12

## 2024-07-12 NOTE — PATIENT INSTRUCTIONS
Radha Hansen,    It was a pleasure to see you today at the [facility].     My recommendations after this visit include:    - Increase Losartan from 12.5 mg to 25 mg daily     - Start Metoprolol succinate 25 mg daily     - Schedule BMP in week     - Monitor BP and heart rate readings and keep record     -Limit salt intake to < 2000 mg/day, daily weight monitoring, and maintain adequate fluid intake at 50 to 60 ounces per day    -Please call if you experience persistent weight gain 2 to 3 pounds 2 days in a row or 5 pounds in a week with shortness of breath, abdominal bloating and leg swelling    - Follow up with Dale in 2 months in Heart Failure clinic    - Follow up with Dr. Sanchez in 4 months     - Please call Heart Failure Nurse Line at [phone], if you have any questions or concerns

## 2024-07-12 NOTE — LETTER
7/12/2024    Physician No Ref-Primary  No address on file    RE: Radha Hansen       Dear Colleague,     I had the pleasure of seeing Radha Hansen in the [facility].          Assessment/Recommendations   Assessment:    1.  Heart failure with Reduced Ejection Fraction with LVEF 38% per cardiac MRI in 2022 with moderate dilated left ventricle/Ischemic Cardiomyopathy:    Echocardiogram on 7/9/2024 showed LVEF severely reduced to 25 to 30% with moderate to severe global hypokinesia with apical akinesia.  No significant valve disease or pericardial effusion noted.  Decline in LVEF likely due to being off of the heart failure medications.      Current weight is 145-147 lbs    On low salt diet< 2000 mg per day    Heart failure regimen includes:    -Beta-blocker therapy with metoprolol succinate 25 mg daily-not started    -ARB therapy with Losartan 12.5 mg daily    -Not on Aldosterone blocker therapy    -Not on SGLT2 Inhibitor with     We discussed and reviewed about heart failure, medication management, and lifestyle management including low sodium diet <2 g/day, daily weight, and staying physically active as tolerated.     2. Coronary artery disease with occluded mid LAD, proximal RCA: History of MI with total occlusion of mid LAD.  Cardiac MRI August 2022-MRI showed low probability of viability.  On aspirin and atorvastatin.    No chest pain.    3.  Dyslipidemia: On atorvastatin 40 mg daily not started yet.-    4.  Hypertension: Blood pressure today is 160/64.    She reports her home blood pressure runs systolic in 160s in the morning and 116 in the evening.  She is asymptomatic.    Diastolic usually runs in 65 to 75    5.  Peripheral artery disease with right lower extremity claudication: Patient was recommended revascularization which is pending.    Plan/Recommendation:  -Increase losartan from 12.5 mg to 25 mg daily  -Start metoprolol succinate 25 mg daily   -Schedule BMP in a week  -Monitor blood  "pressure and heart rate and keep records  -Will continue to work on uptitrate of her GDMT as tolerated  -Continue on low-sodium diet <2000 mg per day, daily weight monitoring, and maintain fluid intake at 50 to 60 ounces per day    Follow up with Dale in 4-8 weeks. Follow up with Dr. Sanchez in 4 months     The longitudinal plan of care for ischemic cardiomyopathy, heart failure with severely reduced ejection fraction, hypertension, dyslipidemia, coronary artery disease was addressed during this visit.?Due to the added   complexity in care, I will continue to support Radha Hansen in the subsequent management of this   condition(s) and with the ongoing continuity of care of this condition(s)\".     History of Present Illness/Subjective    Ms. Radha Hansen is a 74 year old female with a past medical history of peripheral vascular disease, coronary artery disease with occluded LAD, ischemic cardiomyopathy heart failure with reduced ejection fraction diagnosis in 2022, who is seen at [facility] for continue heart failure follow up.    Patient was last seen my colleague in heart failure clinic in September 2022.  She recently saw Dr. Sanchez to  reestablish care.  She was resumed on losartan.    Today, Radha reports she has been taking losartan with no adverse effect.  She has not picked up the prescription for metoprolol and atorvastatin. She denies fatigue, lightheadedness, shortness of breath, dyspnea on exertion, orthopnea, PND, palpitations, chest pain, and abdominal fullness/bloating.      She states she stopped taking all her medications due to issue with cost.    She reports following low-sodium diet.  She reports adequate fluid intake.    ECHO on 7/9/24-Reviewed:   Interpretation Summary   The left ventricle is borderline dilated.  Left ventricular function is decreased. The ejection fraction is 25-30%  (severely reduced).  Moderate to severe global hypokinesia with apical " "akinesia.  Normal right ventricle size and systolic function.  IVC diameter <2.1 cm collapsing >50% with sniff suggests a normal RA pressure  of 3 mmHg.  No hemodynamically significant valvular abnormalities on 2D or color flow  imaging.     Physical Examination Review of Systems   BP (!) 160/64 (BP Location: Right arm, Patient Position: Sitting, Cuff Size: Adult Regular)   Pulse 69   Resp 15   Ht 1.702 m (5' 7\")   Wt 67.6 kg (149 lb)   BMI 23.34 kg/m    Body mass index is 23.34 kg/m .  Wt Readings from Last 3 Encounters:   07/12/24 67.6 kg (149 lb)   07/01/24 67.6 kg (149 lb)   05/31/24 69.6 kg (153 lb 6.4 oz)     General Appearance:   no distress, normal body habitus   ENT/Mouth: membranes moist, no oral lesions or bleeding gums.      EYES:  no scleral icterus, normal conjunctivae   Neck: no carotid bruits or thyromegaly   Chest/Lungs:   lungs are clear to auscultation, no rales or wheezing, equal chest wall expansion    Cardiovascular:   Heart rate regular. Normal first and second heart sounds with no murmurs, rubs, or gallops; Jugular venous pressure flat, mild pedal edema bilaterally    Abdomen:  no organomegaly, masses, bruits, or tenderness; bowel sounds are present   Extremities   no cyanosis or clubbing    CMS intact.   Skin: no xanthelasma, warm.    Neurologic: Alert and oriented X 3 no tremors   Psychiatric: calm and cooperative                                                   Negative unless noted in HPI     Medical History  Surgical History Family History Social History   Past Medical History:   Diagnosis Date    Congestive heart failure (H)     Coronary artery disease     Hyperlipidemia     Hypertension     Myocardial infarction (H)     Pulmonary edema 08/18/2022    Past Surgical History:   Procedure Laterality Date    CORONARY ANGIOGRAPHY ADULT ORDER      CV CORONARY ANGIOGRAM N/A 08/19/2022    Procedure: CV CORONARY ANGIOGRAM;  Surgeon: Neeraj Scales MD;  Location: [facility] CATH LAB CV    " CV LEFT HEART CATH N/A 08/19/2022    Procedure: Left Heart Catheterization;  Surgeon: Neeraj Scales MD;  Location: [facility] CATH LAB CV    CV LEFT VENTRICULOGRAM N/A 08/19/2022    Procedure: Left Ventriculogram;  Surgeon: Neeraj Scales MD;  Location: [facility] CATH LAB CV    No family history on file. Social History     Socioeconomic History    Marital status: Single     Spouse name: Not on file    Number of children: Not on file    Years of education: Not on file    Highest education level: Not on file   Occupational History    Not on file   Tobacco Use    Smoking status: Former     Types: Cigarettes    Smokeless tobacco: Never   Substance and Sexual Activity    Alcohol use: Not Currently    Drug use: Not Currently    Sexual activity: Not Currently   Other Topics Concern    Not on file   Social History Narrative    Not on file     Social Determinants of Health     Financial Resource Strain: Not on file   Food Insecurity: Not on file   Transportation Needs: Not on file   Physical Activity: Not on file   Stress: Not on file   Social Connections: Not on file   Interpersonal Safety: Not on file   Housing Stability: Not on file          Medications  Allergies   Current Outpatient Medications   Medication Sig Dispense Refill    aspirin (ASA) 81 MG chewable tablet Take 1 tablet (81 mg) by mouth daily 90 tablet 3    losartan (COZAAR) 25 MG tablet Take 1 tablet (25 mg) by mouth daily 90 tablet 3    VITAMIN D-VITAMIN K PO Take 1 capsule by mouth daily Dr. Ibrahim's brand. Contains vitamin D3 10,000 units, vitamin K2 100mcg, K7, zinc 20mg, & magnesium 25mg      atorvastatin (LIPITOR) 40 MG tablet Take 1 tablet (40 mg) by mouth daily (Patient not taking: Reported on 7/12/2024) 90 tablet 1    metoprolol succinate ER (TOPROL XL) 25 MG 24 hr tablet Take 1 tablet (25 mg) by mouth daily (Patient not taking: Reported on 7/12/2024) 90 tablet 1    nitroGLYcerin (NITROSTAT) 0.4 MG sublingual tablet For chest pain place 1 tablet  "under the tongue every 5 minutes for 3 doses. If symptoms persist 5 minutes after 1st dose call 911. (Patient not taking: Reported on 7/12/2024) 10 tablet 0    Allergies   Allergen Reactions    Hydralazine Palpitations and Other (See Comments)     \"Pounding heart, throbbing all over, heart raising\"    Penicillins Hives     Childhood reaction ~5-6 years old but reports accidentally prescribed penicillin recently and tolerated so think she may have grown out of the allergy     Lisinopril GI Disturbance         Lab Results    Chemistry/lipid CBC Cardiac Enzymes/BNP/TSH/INR   Lab Results   Component Value Date    CHOL 267 (H) 07/09/2024    HDL 53 07/09/2024    TRIG 87 07/09/2024    BUN 18.6 07/09/2024     07/09/2024    CO2 27 07/09/2024    Lab Results   Component Value Date    WBC 8.8 05/31/2024    HGB 15.4 05/31/2024    HCT 46.1 05/31/2024    MCV 91 05/31/2024     05/31/2024    Lab Results   Component Value Date    TROPONINI 5.09 (HH) 08/18/2022    BNP 1,016 (H) 08/18/2022    INR 1.19 (H) 05/31/2024        50  minutes spent on the date of encounter doing chart review, review of test results, interpretation with above tests, patient visit, and documentation.        This note has been dictated using voice recognition software. Any grammatical, typographical, or context distortions are unintentional and inherent to the software        Thank you for allowing me to participate in the care of your patient.      Sincerely,     CARMEN Story [facility]  cc:   Jeet Sanchez, DO  [address] [place] [address]      "

## 2024-07-18 ENCOUNTER — LAB (OUTPATIENT)
Dept: LAB | Facility: HOSPITAL | Age: 74
End: 2024-07-18
Payer: MEDICARE

## 2024-07-18 DIAGNOSIS — I25.10 CORONARY ARTERY DISEASE INVOLVING NATIVE CORONARY ARTERY OF NATIVE HEART WITHOUT ANGINA PECTORIS: ICD-10-CM

## 2024-07-18 DIAGNOSIS — I50.20 HEART FAILURE WITH REDUCED EJECTION FRACTION (H): ICD-10-CM

## 2024-07-18 DIAGNOSIS — I25.5 ISCHEMIC CARDIOMYOPATHY: ICD-10-CM

## 2024-07-18 LAB
ANION GAP SERPL CALCULATED.3IONS-SCNC: 11 MMOL/L (ref 7–15)
BUN SERPL-MCNC: 14.7 MG/DL (ref 8–23)
CALCIUM SERPL-MCNC: 8.7 MG/DL (ref 8.8–10.4)
CHLORIDE SERPL-SCNC: 104 MMOL/L (ref 98–107)
CREAT SERPL-MCNC: 0.74 MG/DL (ref 0.51–0.95)
EGFRCR SERPLBLD CKD-EPI 2021: 84 ML/MIN/1.73M2
GLUCOSE SERPL-MCNC: 95 MG/DL (ref 70–99)
HCO3 SERPL-SCNC: 24 MMOL/L (ref 22–29)
POTASSIUM SERPL-SCNC: 4.1 MMOL/L (ref 3.4–5.3)
SODIUM SERPL-SCNC: 139 MMOL/L (ref 135–145)

## 2024-07-18 PROCEDURE — 36415 COLL VENOUS BLD VENIPUNCTURE: CPT

## 2024-07-18 PROCEDURE — 80048 BASIC METABOLIC PNL TOTAL CA: CPT

## 2024-10-14 ENCOUNTER — OFFICE VISIT (OUTPATIENT)
Dept: CARDIOLOGY | Facility: CLINIC | Age: 74
End: 2024-10-14
Attending: NURSE PRACTITIONER
Payer: MEDICARE

## 2024-10-14 VITALS
BODY MASS INDEX: 23.42 KG/M2 | HEART RATE: 80 BPM | RESPIRATION RATE: 20 BRPM | WEIGHT: 149.5 LBS | OXYGEN SATURATION: 97 % | DIASTOLIC BLOOD PRESSURE: 70 MMHG | SYSTOLIC BLOOD PRESSURE: 152 MMHG

## 2024-10-14 DIAGNOSIS — I25.10 CORONARY ARTERY DISEASE INVOLVING NATIVE CORONARY ARTERY OF NATIVE HEART WITHOUT ANGINA PECTORIS: ICD-10-CM

## 2024-10-14 DIAGNOSIS — I50.20 HEART FAILURE WITH REDUCED EJECTION FRACTION (H): Primary | ICD-10-CM

## 2024-10-14 DIAGNOSIS — E78.2 MIXED HYPERLIPIDEMIA: ICD-10-CM

## 2024-10-14 DIAGNOSIS — I25.5 ISCHEMIC CARDIOMYOPATHY: ICD-10-CM

## 2024-10-14 LAB
ANION GAP SERPL CALCULATED.3IONS-SCNC: 15 MMOL/L (ref 7–15)
BUN SERPL-MCNC: 22.2 MG/DL (ref 8–23)
CALCIUM SERPL-MCNC: 8.7 MG/DL (ref 8.8–10.4)
CHLORIDE SERPL-SCNC: 103 MMOL/L (ref 98–107)
CREAT SERPL-MCNC: 0.91 MG/DL (ref 0.51–0.95)
EGFRCR SERPLBLD CKD-EPI 2021: 66 ML/MIN/1.73M2
GLUCOSE SERPL-MCNC: 92 MG/DL (ref 70–99)
HCO3 SERPL-SCNC: 22 MMOL/L (ref 22–29)
NT-PROBNP SERPL-MCNC: 2781 PG/ML (ref 0–900)
POTASSIUM SERPL-SCNC: 4.3 MMOL/L (ref 3.4–5.3)
SODIUM SERPL-SCNC: 140 MMOL/L (ref 135–145)

## 2024-10-14 PROCEDURE — G2211 COMPLEX E/M VISIT ADD ON: HCPCS | Performed by: INTERNAL MEDICINE

## 2024-10-14 PROCEDURE — 83880 ASSAY OF NATRIURETIC PEPTIDE: CPT | Performed by: INTERNAL MEDICINE

## 2024-10-14 PROCEDURE — 36415 COLL VENOUS BLD VENIPUNCTURE: CPT | Performed by: INTERNAL MEDICINE

## 2024-10-14 PROCEDURE — 80048 BASIC METABOLIC PNL TOTAL CA: CPT | Performed by: INTERNAL MEDICINE

## 2024-10-14 PROCEDURE — 99215 OFFICE O/P EST HI 40 MIN: CPT | Performed by: INTERNAL MEDICINE

## 2024-10-14 RX ORDER — SPIRONOLACTONE 25 MG/1
25 TABLET ORAL DAILY
Qty: 90 TABLET | Refills: 3 | Status: SHIPPED | OUTPATIENT
Start: 2024-10-14

## 2024-10-14 NOTE — LETTER
10/14/2024    Physician No Ref-Primary  No address on file    RE: Radha Hansen       Dear Colleague,     I had the pleasure of seeing Radha Hansen in the [facility].    HEART CARE ENCOUNTER CONSULTATON NOTE      [facility]  [phone]      Assessment/Recommendations   Assessment:   Heart failure with reduced ejection fraction, LVEF: 38% (MRI, 2022).  Moderate dilated LV.    Coronary Artery Disease, occluded mid LAD, proximal RCA disease  History of Myocardial Infarction with total occlusion of mid LAD (near transmural infarction anterior wall).  Peripheral arterial disease with right lower extremity resting claudication pain  Hyperlipidemia, LDL: 155.    Hypertension  6.  Cardiomegaly on CT scan    Plan:   Start spironolactone 25 mg daily  Continue losartan  Continue metoprolol XL  Consider Jardiance  Repeat echocardiogram in December.  Continue aspirin  Continue atorvastatin, target LDL less than 55 given (and coronary disease  Check BMP and BNP.  Repeat in 2 weeks after starting spironolactone       History of Present Illness/Subjective    HPI: Radha Hansen is a 74 year old female prior history of coronary artery disease, occluded LAD, ischemic cardiomyopathy, heart failure with reduced ejection fraction presents cardiology clinic.     Last clinical evaluation she has noticed improvement in her dyspnea exertion.  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  She denies any anginal chest pain.  No lightheadedness or syncope.  No orthopnea or PND symptoms.  Overall doing well.  Blood pressure is slightly elevated today starting spironolactone.        ECG: May 2024: Sinus rhythm, heart rate 83 bpm.  QRS duration 94 ms.  QTc interval 500 ms.  Evidence of left ventricular hypertrophy.  Left axis deviation.  Anteroseptal infarct pattern.  T wave inversions slightly worse in the lateral leads.  Compared to prior in  2022.    Cardiac MRI: 2022  .  Left ventricular size is moderately enlarged. Wall thickness is normal.  Systolic function is  moderately reduced with [id number] anterior, anteroseptal, and apical akinesis. The quantified left  ventricular ejection fraction is 38%.   2.  Normal right ventricular size and systolic function.  The quantified right ventricular ejection  fraction is 66%.   3. There is a moderate-sized area of near transmural scar in the [id number] anterior, anteroseptal, and  apical walls of the left ventricle suggestive of prior myocardial infarction in the territory of the left  anterior descending artery. These wall segments have a low probability of viability. No significant scar  seen in the territories of the right coronary or left circumflex arteries.  4.  Mild left atrial enlargement.  5.  No significant valvular abnormalities.    Coronary Angiogram: 2022  Mid LAD lesion is 100% stenosed. Distal vessel weaky supported fromDiag 1 collaterals  Prox RCA to Mid RCA lesion is 20% stenosed.    Echocardiogram Results: 2022  The left atrium is mildly dilated.  There is borderline concentric left ventricular hypertrophy.  The visual ejection fraction is 15-20%.  Diastolic Doppler findings (E/E' ratio and/or other parameters) suggest left  ventricular filling pressures are increased.  There is anterior, septal, and apical wall akinesis.  There is inferior wall akinesis.  Mildly decreased right ventricular systolic function  No significant valve abnormality.     Physical Examination  Review of Systems   Vitals: BP (!) 152/70 (BP Location: Right arm, Patient Position: Sitting, Cuff Size: Adult Regular)   Pulse 80   Resp 20   Wt 67.8 kg (149 lb 8 oz)   SpO2 97%   BMI 23.42 kg/m    BMI= Body mass index is 23.42 kg/m .  Wt Readings from Last 3 Encounters:   10/14/24 67.8 kg (149 lb 8 oz)   07/12/24 67.6 kg (149 lb)   07/01/24 67.6 kg (149 lb)           Please refer above for cardiac ROS details.         Medical History  Surgical History Family History Social History   Past Medical History:   Diagnosis Date     Congestive heart failure (H)      Coronary artery disease      Hyperlipidemia      Hypertension      Myocardial infarction (H)      Pulmonary edema 08/18/2022     Past Surgical History:   Procedure Laterality Date     CORONARY ANGIOGRAPHY ADULT ORDER       CV CORONARY ANGIOGRAM N/A 08/19/2022    Procedure: CV CORONARY ANGIOGRAM;  Surgeon: Neeraj Scales MD;  Location: [facility] CATH LAB CV     CV LEFT HEART CATH N/A 08/19/2022    Procedure: Left Heart Catheterization;  Surgeon: Neeraj Scales MD;  Location: [facility] CATH LAB CV     CV LEFT VENTRICULOGRAM N/A 08/19/2022    Procedure: Left Ventriculogram;  Surgeon: Neeraj Scales MD;  Location: [facility] CATH LAB CV     No family history on file.     Social History     Socioeconomic History     Marital status: Single     Spouse name: Not on file     Number of children: Not on file     Years of education: Not on file     Highest education level: Not on file   Occupational History     Not on file   Tobacco Use     Smoking status: Former     Types: Cigarettes     Smokeless tobacco: Never   Substance and Sexual Activity     Alcohol use: Not Currently     Drug use: Not Currently     Sexual activity: Not Currently   Other Topics Concern     Not on file   Social History Narrative     Not on file     Social Determinants of Health     Financial Resource Strain: Not on file   Food Insecurity: Not on file   Transportation Needs: Not on file   Physical Activity: Not on file   Stress: Not on file   Social Connections: Not on file   Interpersonal Safety: Not on file   Housing Stability: Not on file           Medications  Allergies   Current Outpatient Medications   Medication Sig Dispense Refill     aspirin (ASA) 81 MG chewable tablet Take 1 tablet (81 mg) by mouth daily 90 tablet 3     losartan (COZAAR) 25 MG tablet Take 1 tablet (25 mg) by mouth daily 90  "tablet 3     metoprolol succinate ER (TOPROL XL) 25 MG 24 hr tablet Take 1 tablet (25 mg) by mouth daily 90 tablet 1     VITAMIN D-VITAMIN K PO Take 1 capsule by mouth daily Dr. Ibrahim's brand. Contains vitamin D3 10,000 units, vitamin K2 100mcg, K7, zinc 20mg, & magnesium 25mg       atorvastatin (LIPITOR) 40 MG tablet Take 1 tablet (40 mg) by mouth daily (Patient not taking: Reported on 7/12/2024) 90 tablet 1     nitroGLYcerin (NITROSTAT) 0.4 MG sublingual tablet For chest pain place 1 tablet under the tongue every 5 minutes for 3 doses. If symptoms persist 5 minutes after 1st dose call 911. (Patient not taking: Reported on 7/12/2024) 10 tablet 0       Allergies   Allergen Reactions     Hydralazine Palpitations and Other (See Comments)     \"Pounding heart, throbbing all over, heart raising\"     Penicillins Hives     Childhood reaction ~5-6 years old but reports accidentally prescribed penicillin recently and tolerated so think she may have grown out of the allergy      Lisinopril GI Disturbance          Lab Results    Chemistry/lipid CBC Cardiac Enzymes/BNP/TSH/INR   Recent Labs   Lab Test 09/20/22  1421   CHOL 220*   HDL 46*   *   TRIG 94     Recent Labs   Lab Test 09/20/22  1421   *     Recent Labs   Lab Test 09/20/22  1421      POTASSIUM 4.2   CHLORIDE 107   CO2 22   GLC 97   BUN 15.7   CR 0.68   GFRESTIMATED >90   JENNIFFER 9.3     Recent Labs   Lab Test 09/20/22  1421 08/21/22  0431 08/19/22  0742   CR 0.68 0.65 0.80     No results for input(s): \"A1C\" in the last 87267 hours.       Recent Labs   Lab Test 05/31/24  1554   WBC 8.8   HGB 15.4   HCT 46.1   MCV 91        Recent Labs   Lab Test 05/31/24  1554 08/22/22  1533 08/21/22  0431   HGB 15.4 15.2 15.3    Recent Labs   Lab Test 08/18/22  2132 08/18/22  1509 08/18/22  0928   TROPONINI 5.09* 3.39* 0.88*     Recent Labs   Lab Test 08/18/22  0550   BNP 1,016*     No results for input(s): \"TSH\" in the last 73250 hours.  Recent Labs   Lab Test " 05/31/24  1554 08/18/22  0550   INR 1.19* 1.13        Jeet Sanchez DO  Cardiologist     Today's clinic visit entailed:    42 minutes spent by me on the date of the encounter doing chart review, history and exam, documentation and further activities per the note    The longitudinal plan of care for the diagnosis(es)/condition(s) as documented were addressed during this visit. Due to the added complexity in care, I will continue to support Radha in the subsequent management and with ongoing continuity of care.                               Thank you for allowing me to participate in the care of your patient.      Sincerely,     Jeet Sanchez DO     [facility]  cc:   CARMEN Story CNP  [address]  [place] [address]

## 2024-10-14 NOTE — PROGRESS NOTES
HEART CARE ENCOUNTER CONSULTATON RACHNA GAN [facility]  [phone]      Assessment/Recommendations   Assessment:   Heart failure with reduced ejection fraction, LVEF: 38% (MRI, 2022).  Moderate dilated LV.    Coronary Artery Disease, occluded mid LAD, proximal RCA disease  History of Myocardial Infarction with total occlusion of mid LAD (near transmural infarction anterior wall).  Peripheral arterial disease with right lower extremity resting claudication pain  Hyperlipidemia, LDL: 155.    Hypertension  6.  Cardiomegaly on CT scan    Plan:   Start spironolactone 25 mg daily  Continue losartan  Continue metoprolol XL  Consider Jardiance  Repeat echocardiogram in December.  Continue aspirin  Continue atorvastatin, target LDL less than 55 given (and coronary disease  Check BMP and BNP.  Repeat in 2 weeks after starting spironolactone       History of Present Illness/Subjective    HPI: Radha Hansen is a 74 year old female prior history of coronary artery disease, occluded LAD, ischemic cardiomyopathy, heart failure with reduced ejection fraction presents cardiology clinic.     Last clinical evaluation she has noticed improvement in her dyspnea exertion.  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  She denies any anginal chest pain.  No lightheadedness or syncope.  No orthopnea or PND symptoms.  Overall doing well.  Blood pressure is slightly elevated today starting spironolactone.        ECG: May 2024: Sinus rhythm, heart rate 83 bpm.  QRS duration 94 ms.  QTc interval 500 ms.  Evidence of left ventricular hypertrophy.  Left axis deviation.  Anteroseptal infarct pattern.  T wave inversions slightly worse in the lateral leads.  Compared to prior in 2022.    Cardiac MRI: 2022  .  Left ventricular size is moderately enlarged. Wall thickness is normal.  Systolic function is  moderately reduced with [id number] anterior, anteroseptal, and  apical akinesis. The quantified left  ventricular ejection fraction is 38%.   2.  Normal right ventricular size and systolic function.  The quantified right ventricular ejection  fraction is 66%.   3. There is a moderate-sized area of near transmural scar in the [id number] anterior, anteroseptal, and  apical walls of the left ventricle suggestive of prior myocardial infarction in the territory of the left  anterior descending artery. These wall segments have a low probability of viability. No significant scar  seen in the territories of the right coronary or left circumflex arteries.  4.  Mild left atrial enlargement.  5.  No significant valvular abnormalities.    Coronary Angiogram: 2022  Mid LAD lesion is 100% stenosed. Distal vessel weaky supported fromDiag 1 collaterals  Prox RCA to Mid RCA lesion is 20% stenosed.    Echocardiogram Results: 2022  The left atrium is mildly dilated.  There is borderline concentric left ventricular hypertrophy.  The visual ejection fraction is 15-20%.  Diastolic Doppler findings (E/E' ratio and/or other parameters) suggest left  ventricular filling pressures are increased.  There is anterior, septal, and apical wall akinesis.  There is inferior wall akinesis.  Mildly decreased right ventricular systolic function  No significant valve abnormality.     Physical Examination  Review of Systems   Vitals: BP (!) 152/70 (BP Location: Right arm, Patient Position: Sitting, Cuff Size: Adult Regular)   Pulse 80   Resp 20   Wt 67.8 kg (149 lb 8 oz)   SpO2 97%   BMI 23.42 kg/m    BMI= Body mass index is 23.42 kg/m .  Wt Readings from Last 3 Encounters:   10/14/24 67.8 kg (149 lb 8 oz)   07/12/24 67.6 kg (149 lb)   07/01/24 67.6 kg (149 lb)           Please refer above for cardiac ROS details.        Medical History  Surgical History Family History Social History   Past Medical History:   Diagnosis Date    Congestive heart failure (H)     Coronary artery disease     Hyperlipidemia      Hypertension     Myocardial infarction (H)     Pulmonary edema 08/18/2022     Past Surgical History:   Procedure Laterality Date    CORONARY ANGIOGRAPHY ADULT ORDER      CV CORONARY ANGIOGRAM N/A 08/19/2022    Procedure: CV CORONARY ANGIOGRAM;  Surgeon: Neeraj Scales MD;  Location: [facility] CATH LAB CV    CV LEFT HEART CATH N/A 08/19/2022    Procedure: Left Heart Catheterization;  Surgeon: Neeraj Scales MD;  Location: [facility] CATH LAB CV    CV LEFT VENTRICULOGRAM N/A 08/19/2022    Procedure: Left Ventriculogram;  Surgeon: Neeraj Scales MD;  Location: [facility] CATH LAB CV     No family history on file.     Social History     Socioeconomic History    Marital status: Single     Spouse name: Not on file    Number of children: Not on file    Years of education: Not on file    Highest education level: Not on file   Occupational History    Not on file   Tobacco Use    Smoking status: Former     Types: Cigarettes    Smokeless tobacco: Never   Substance and Sexual Activity    Alcohol use: Not Currently    Drug use: Not Currently    Sexual activity: Not Currently   Other Topics Concern    Not on file   Social History Narrative    Not on file     Social Determinants of Health     Financial Resource Strain: Not on file   Food Insecurity: Not on file   Transportation Needs: Not on file   Physical Activity: Not on file   Stress: Not on file   Social Connections: Not on file   Interpersonal Safety: Not on file   Housing Stability: Not on file           Medications  Allergies   Current Outpatient Medications   Medication Sig Dispense Refill    aspirin (ASA) 81 MG chewable tablet Take 1 tablet (81 mg) by mouth daily 90 tablet 3    losartan (COZAAR) 25 MG tablet Take 1 tablet (25 mg) by mouth daily 90 tablet 3    metoprolol succinate ER (TOPROL XL) 25 MG 24 hr tablet Take 1 tablet (25 mg) by mouth daily 90 tablet 1    VITAMIN D-VITAMIN K PO Take 1 capsule by mouth daily Dr. Ibrahim's brand. Contains vitamin D3 10,000  "units, vitamin K2 100mcg, K7, zinc 20mg, & magnesium 25mg      atorvastatin (LIPITOR) 40 MG tablet Take 1 tablet (40 mg) by mouth daily (Patient not taking: Reported on 7/12/2024) 90 tablet 1    nitroGLYcerin (NITROSTAT) 0.4 MG sublingual tablet For chest pain place 1 tablet under the tongue every 5 minutes for 3 doses. If symptoms persist 5 minutes after 1st dose call 911. (Patient not taking: Reported on 7/12/2024) 10 tablet 0       Allergies   Allergen Reactions    Hydralazine Palpitations and Other (See Comments)     \"Pounding heart, throbbing all over, heart raising\"    Penicillins Hives     Childhood reaction ~5-6 years old but reports accidentally prescribed penicillin recently and tolerated so think she may have grown out of the allergy     Lisinopril GI Disturbance          Lab Results    Chemistry/lipid CBC Cardiac Enzymes/BNP/TSH/INR   Recent Labs   Lab Test 09/20/22  1421   CHOL 220*   HDL 46*   *   TRIG 94     Recent Labs   Lab Test 09/20/22  1421   *     Recent Labs   Lab Test 09/20/22  1421      POTASSIUM 4.2   CHLORIDE 107   CO2 22   GLC 97   BUN 15.7   CR 0.68   GFRESTIMATED >90   JENNIFFER 9.3     Recent Labs   Lab Test 09/20/22  1421 08/21/22  0431 08/19/22  0742   CR 0.68 0.65 0.80     No results for input(s): \"A1C\" in the last 91604 hours.       Recent Labs   Lab Test 05/31/24  1554   WBC 8.8   HGB 15.4   HCT 46.1   MCV 91        Recent Labs   Lab Test 05/31/24  1554 08/22/22  1533 08/21/22  0431   HGB 15.4 15.2 15.3    Recent Labs   Lab Test 08/18/22  2132 08/18/22  1509 08/18/22  0928   TROPONINI 5.09* 3.39* 0.88*     Recent Labs   Lab Test 08/18/22  0550   BNP 1,016*     No results for input(s): \"TSH\" in the last 38841 hours.  Recent Labs   Lab Test 05/31/24  1554 08/18/22  0550   INR 1.19* 1.13        Jeet A. Laura, DO  Cardiologist     Today's clinic visit entailed:    42 minutes spent by me on the date of the encounter doing chart review, history and exam, " documentation and further activities per the note    The longitudinal plan of care for the diagnosis(es)/condition(s) as documented were addressed during this visit. Due to the added complexity in care, I will continue to support Radha in the subsequent management and with ongoing continuity of care.

## 2024-10-14 NOTE — PATIENT INSTRUCTIONS
Please contact direct nurses line Monday through Friday 8 AM to 5 PM @ [phone]    After-hours contact cardiology office at [phone].    Plan:    Start spironolactone    Check labs in two weeks.

## 2024-11-04 ENCOUNTER — LAB (OUTPATIENT)
Dept: LAB | Facility: HOSPITAL | Age: 74
End: 2024-11-04
Payer: MEDICARE

## 2024-11-04 DIAGNOSIS — I25.10 CORONARY ARTERY DISEASE INVOLVING NATIVE CORONARY ARTERY OF NATIVE HEART WITHOUT ANGINA PECTORIS: ICD-10-CM

## 2024-11-04 DIAGNOSIS — I25.5 ISCHEMIC CARDIOMYOPATHY: ICD-10-CM

## 2024-11-04 DIAGNOSIS — I50.20 HEART FAILURE WITH REDUCED EJECTION FRACTION (H): ICD-10-CM

## 2024-11-04 DIAGNOSIS — E78.2 MIXED HYPERLIPIDEMIA: ICD-10-CM

## 2024-11-04 LAB
ANION GAP SERPL CALCULATED.3IONS-SCNC: 10 MMOL/L (ref 7–15)
BUN SERPL-MCNC: 32.4 MG/DL (ref 8–23)
CALCIUM SERPL-MCNC: 9.5 MG/DL (ref 8.8–10.4)
CHLORIDE SERPL-SCNC: 108 MMOL/L (ref 98–107)
CHOLEST SERPL-MCNC: 209 MG/DL
CREAT SERPL-MCNC: 1.05 MG/DL (ref 0.51–0.95)
EGFRCR SERPLBLD CKD-EPI 2021: 55 ML/MIN/1.73M2
FASTING STATUS PATIENT QL REPORTED: NO
FASTING STATUS PATIENT QL REPORTED: NO
GLUCOSE SERPL-MCNC: 99 MG/DL (ref 70–99)
HCO3 SERPL-SCNC: 23 MMOL/L (ref 22–29)
HDLC SERPL-MCNC: 55 MG/DL
LDLC SERPL CALC-MCNC: 111 MG/DL
NONHDLC SERPL-MCNC: 154 MG/DL
NT-PROBNP SERPL-MCNC: 2110 PG/ML (ref 0–900)
POTASSIUM SERPL-SCNC: 4.2 MMOL/L (ref 3.4–5.3)
SODIUM SERPL-SCNC: 141 MMOL/L (ref 135–145)
TRIGL SERPL-MCNC: 216 MG/DL

## 2024-11-04 PROCEDURE — 83880 ASSAY OF NATRIURETIC PEPTIDE: CPT

## 2024-11-04 PROCEDURE — 36415 COLL VENOUS BLD VENIPUNCTURE: CPT

## 2024-11-04 PROCEDURE — 80048 BASIC METABOLIC PNL TOTAL CA: CPT

## 2024-11-04 PROCEDURE — 82465 ASSAY BLD/SERUM CHOLESTEROL: CPT

## 2024-11-04 PROCEDURE — 82947 ASSAY GLUCOSE BLOOD QUANT: CPT

## 2024-11-07 ENCOUNTER — MYC MEDICAL ADVICE (OUTPATIENT)
Dept: CARDIOLOGY | Facility: CLINIC | Age: 74
End: 2024-11-07
Payer: MEDICARE

## 2024-11-07 NOTE — RESULT ENCOUNTER NOTE
Reviewed labs in detail.  Patient continues to have significantly elevated BNP level and relatively stable renal function.  I would recommend that she is started on Jardiance 10 mg daily.  Please advise patient that this can be an expensive medication if she does not have coverage.  Jardiance has been well-documented to improve patient with heart failure symptoms particularly with volume overload.  Would recommend after starting Jardiance to repeat labs in 10 days including BNP level and BMP levels.  Indication for Jardiance is heart failure with reduced ejection fraction

## 2024-11-15 NOTE — TELEPHONE ENCOUNTER
[place] Community Me[place] Blood [place] reviewed.  Patients recent blood pressure is outside of guideline parameters.  Called pt to review, no answer.  Left voicemail message asking patient to check their blood pressure using a home blood pressure cuff or by going to a [facility].  Patient instructed to then call [phone] ([facility]) and leave a message with their name, date of birth, and blood pressure reading that was completed within the last 24 hours and where it was completed.  Will await call back for further review.  Brenna Denis MA

## 2024-11-21 VITALS — DIASTOLIC BLOOD PRESSURE: 75 MMHG | HEART RATE: 80 BPM | SYSTOLIC BLOOD PRESSURE: 137 MMHG

## 2024-11-21 NOTE — TELEPHONE ENCOUNTER
Patient returned call and left voicemail message with update blood pressure reading.      Last Blood Pressure: 152/70  Last Heart Rate: 80  Date: 10/14/24  Location: [facility]    Today's Blood Pressure: 137/75  Today's Heart Rate: 80  Location: Home BP    Patient reported blood pressure updated in Epic. Blood pressure falls within [place] Community Measures guidelines.  Patient will follow up as previously advised.

## 2024-12-03 DIAGNOSIS — I50.20 HEART FAILURE WITH REDUCED EJECTION FRACTION (H): Primary | ICD-10-CM

## 2024-12-03 DIAGNOSIS — I25.5 ISCHEMIC CARDIOMYOPATHY: ICD-10-CM

## 2024-12-03 DIAGNOSIS — I25.10 CORONARY ARTERY DISEASE INVOLVING NATIVE CORONARY ARTERY OF NATIVE HEART WITHOUT ANGINA PECTORIS: ICD-10-CM

## 2024-12-03 RX ORDER — METOPROLOL SUCCINATE 25 MG/1
25 TABLET, EXTENDED RELEASE ORAL DAILY
Qty: 90 TABLET | Refills: 1 | Status: SHIPPED | OUTPATIENT
Start: 2024-12-03

## 2024-12-03 RX ORDER — LOSARTAN POTASSIUM 25 MG/1
25 TABLET ORAL DAILY
Qty: 90 TABLET | Refills: 1 | Status: SHIPPED | OUTPATIENT
Start: 2024-12-03

## 2024-12-08 ENCOUNTER — HEALTH MAINTENANCE LETTER (OUTPATIENT)
Age: 74
End: 2024-12-08

## 2025-02-10 ENCOUNTER — OFFICE VISIT (OUTPATIENT)
Dept: CARDIOLOGY | Facility: CLINIC | Age: 75
End: 2025-02-10
Attending: NURSE PRACTITIONER
Payer: MEDICARE

## 2025-02-10 VITALS
SYSTOLIC BLOOD PRESSURE: 152 MMHG | WEIGHT: 148 LBS | DIASTOLIC BLOOD PRESSURE: 76 MMHG | BODY MASS INDEX: 23.23 KG/M2 | RESPIRATION RATE: 16 BRPM | HEART RATE: 79 BPM | HEIGHT: 67 IN

## 2025-02-10 DIAGNOSIS — I25.5 ISCHEMIC CARDIOMYOPATHY: ICD-10-CM

## 2025-02-10 DIAGNOSIS — I50.20 HEART FAILURE WITH REDUCED EJECTION FRACTION (H): Primary | ICD-10-CM

## 2025-02-10 DIAGNOSIS — I10 BENIGN ESSENTIAL HYPERTENSION: ICD-10-CM

## 2025-02-10 DIAGNOSIS — I25.10 CORONARY ARTERY DISEASE INVOLVING NATIVE CORONARY ARTERY OF NATIVE HEART WITHOUT ANGINA PECTORIS: ICD-10-CM

## 2025-02-10 PROBLEM — E78.5 DYSLIPIDEMIA, GOAL LDL BELOW 70: Status: ACTIVE | Noted: 2022-09-20

## 2025-02-10 PROCEDURE — G2211 COMPLEX E/M VISIT ADD ON: HCPCS | Performed by: NURSE PRACTITIONER

## 2025-02-10 PROCEDURE — 99214 OFFICE O/P EST MOD 30 MIN: CPT | Performed by: NURSE PRACTITIONER

## 2025-02-10 RX ORDER — CLOPIDOGREL BISULFATE 75 MG/1
1 TABLET ORAL
COMMUNITY
Start: 2025-02-03

## 2025-02-10 NOTE — PROGRESS NOTES
Assessment/Recommendations   Assessment:      # Chronic Heart failure with Reduced Ejection Fraction with LVEF 38% per cardiac MRI in 2022 with moderate dilated left ventricle/Ischemic Cardiomyopathy, NYHA class I-II:    Echocardiogram on 7/9/2024 showed LVEF severely reduced to 25 to 30% with moderate to severe global hypokinesia with apical akinesia.  No significant valve disease or pericardial effusion noted.  Decline in LVEF likely due to being off of the heart failure medications.      Current weight is 145-147 lbs    On low salt diet< 2000 mg per day  Reports inadequate fluid intake     Occasionally lightheaded or dizziness when bending forward or stand up.    Patient is well compensated on exam.    Heart failure regimen includes:    -Beta-blocker therapy with metoprolol succinate 25 mg daily    -ARB therapy with Losartan 25 mg daily    -Aldosterone blocker therapy with spironolactone 25 mg    -SGLT2 Inhibitor with Jardiance 10 mg daily -not taking due to cost issue.  Declined assistance with applying for financial assistance.    # Coronary artery disease with occluded mid LAD, proximal RCA: History of MI with total occlusion of mid LAD.  Cardiac MRI August 2022-MRI showed low probability of viability.  No chest pain.    # Dyslipidemia with LDL goal <70: LDL and triglyceride elevated and not at goal in October 2024.  Not taking atorvastatin due to myalgia.    #  Hypertension: Blood pressure today is 152/76.  Not controlled.    Plan/Recommendation:  -Patient declined BMP and NT proBNP checked today.  She would like to do it next week.  -She declined repeat limited echo.  She will call us back when she is willing to have repeat echo.  -If stable renal function, consider increasing losartan for blood pressure control.  -Discussed about starting on alternative statin therapy with rosuvastatin.  Patient declined.    Follow up with  Dr. Sanchez or PK Denney in 2 months.  Patient prefers to  "follow-up in heart failure clinic as needed for now as she is overwhelmed with multiple medical appointments.     The longitudinal plan of care for ischemic cardiomyopathy, heart failure with severely reduced ejection fraction, hypertension, dyslipidemia, coronary artery disease was addressed during this visit.?Due to the added   complexity in care, I will continue to support Radha Hansen in the subsequent management of this   condition(s) and with the ongoing continuity of care of this condition(s)\".     History of Present Illness/Subjective    Ms. Radha Hansen is a 74 year old female with a past medical history of peripheral vascular disease with status post revascularization of right lower extremity, coronary artery disease with occluded LAD, ischemic cardiomyopathy heart failure with reduced ejection fraction diagnosed in 2022, who is seen at [facility] for continue heart failure follow up.    During last heart failure clinic visit, patient reported that she has not picked up her prescription for metoprolol and atorvastatin.  Patient was instructed to start on metoprolol.  Her losartan dose was increased from 12.5 to 25 mg daily.    Patient saw Dr. Sanchez in October.  Her NT proBNP was found elevated in 2000's.  She was recommended to start on Jardiance.  She was also started on spironolactone.  Her repeat blood work was found stable.    Today, Radha reports stable from heart failure standpoint.  She states that she did not  Jardiance due to cost issue.  She declined assistance with applying for  program. She denies fatigue, shortness of breath, dyspnea on exertion, orthopnea, PND, palpitations, chest pain, and abdominal fullness/bloating.  She reports feeling some lightheadedness bending forward or with position change which usually lasts briefly.    Patient reported that she underwent revascularization of right lower extremity in January through " "[facility] vascular clinic.  She reports feeling improvement in her claudication.    ECHO on 7/9/24-Reviewed:   Interpretation Summary   The left ventricle is borderline dilated.  Left ventricular function is decreased. The ejection fraction is 25-30%  (severely reduced).  Moderate to severe global hypokinesia with apical akinesia.  Normal right ventricle size and systolic function.  IVC diameter <2.1 cm collapsing >50% with sniff suggests a normal RA pressure  of 3 mmHg.  No hemodynamically significant valvular abnormalities on 2D or color flow  imaging.     Physical Examination Review of Systems   BP (!) 152/76 (BP Location: Left arm, Patient Position: Sitting, Cuff Size: Adult Regular)   Pulse 79   Resp 16   Ht 1.702 m (5' 7\")   Wt 67.1 kg (148 lb)   BMI 23.18 kg/m    Body mass index is 23.18 kg/m .  Wt Readings from Last 3 Encounters:   02/10/25 67.1 kg (148 lb)   10/14/24 67.8 kg (149 lb 8 oz)   07/12/24 67.6 kg (149 lb)     General Appearance:   no distress, normal body habitus   ENT/Mouth: membranes moist, no oral lesions or bleeding gums.      EYES:  no scleral icterus, normal conjunctivae   Neck: no thyromegaly   Chest/Lungs:   lungs are clear to auscultation, no rales or wheezing, equal chest wall expansion    Cardiovascular:   Heart rate regular. Normal first and second heart sounds with no murmurs, rubs, or gallops; Jugular venous pressure flat, mild pedal edema bilaterally    Abdomen:  no organomegaly, masses, bruits, or tenderness; bowel sounds are present   Extremities   no cyanosis or clubbing    CMS intact.   Skin: no xanthelasma, warm.    Neurologic: Alert and oriented X 3 no tremors   Psychiatric: calm and cooperative                                                   Negative unless noted in HPI     Medical History  Surgical History Family History Social History   Past Medical History:   Diagnosis Date    Congestive heart failure (H)     Coronary artery disease     Hyperlipidemia     Hypertension  "    Myocardial infarction (H)     Pulmonary edema 08/18/2022    Past Surgical History:   Procedure Laterality Date    CORONARY ANGIOGRAPHY ADULT ORDER      CV CORONARY ANGIOGRAM N/A 08/19/2022    Procedure: CV CORONARY ANGIOGRAM;  Surgeon: Neeraj Scales MD;  Location: [facility] CATH LAB CV    CV LEFT HEART CATH N/A 08/19/2022    Procedure: Left Heart Catheterization;  Surgeon: Neeraj Scales MD;  Location: [facility] CATH LAB CV    CV LEFT VENTRICULOGRAM N/A 08/19/2022    Procedure: Left Ventriculogram;  Surgeon: Neeraj Scales MD;  Location: [facility] CATH LAB CV    No family history on file. Social History     Socioeconomic History    Marital status: Single     Spouse name: Not on file    Number of children: Not on file    Years of education: Not on file    Highest education level: Not on file   Occupational History    Not on file   Tobacco Use    Smoking status: Former     Types: Cigarettes    Smokeless tobacco: Never   Substance and Sexual Activity    Alcohol use: Not Currently    Drug use: Not Currently    Sexual activity: Not Currently   Other Topics Concern    Not on file   Social History Narrative    Not on file     Social Drivers of Health     Financial Resource Strain: Not on file   Food Insecurity: Not on file   Transportation Needs: Not on file   Physical Activity: Not on file   Stress: Not on file   Social Connections: Not on file   Interpersonal Safety: Not on file   Housing Stability: Not on file          Medications  Allergies   Current Outpatient Medications   Medication Sig Dispense Refill    aspirin (ASA) 81 MG chewable tablet Take 1 tablet (81 mg) by mouth daily 90 tablet 3    clopidogrel (PLAVIX) 75 MG tablet Take 1 tablet by mouth daily at 2 pm.      empagliflozin (JARDIANCE) 10 MG TABS tablet Take 1 tablet (10 mg) by mouth daily. 90 tablet 1    losartan (COZAAR) 25 MG tablet Take 1 tablet (25 mg) by mouth daily. 90 tablet 1    metoprolol succinate ER (TOPROL XL) 25 MG 24 hr tablet Take  "1 tablet (25 mg) by mouth daily. 90 tablet 1    nitroGLYcerin (NITROSTAT) 0.4 MG sublingual tablet For chest pain place 1 tablet under the tongue every 5 minutes for 3 doses. If symptoms persist 5 minutes after 1st dose call 911. (Patient taking differently: Place 0.4 mg under the tongue every 5 minutes as needed for chest pain. For chest pain place 1 tablet under the tongue every 5 minutes for 3 doses. If symptoms persist 5 minutes after 1st dose call 911.) 10 tablet 0    spironolactone (ALDACTONE) 25 MG tablet Take 1 tablet (25 mg) by mouth daily. 90 tablet 3    VITAMIN D-VITAMIN K PO Take 1 capsule by mouth daily Dr. Ibrahim's brand. Contains vitamin D3 10,000 units, vitamin K2 100mcg, K7, zinc 20mg, & magnesium 25mg      atorvastatin (LIPITOR) 40 MG tablet Take 1 tablet (40 mg) by mouth daily (Patient not taking: Reported on 2/10/2025) 90 tablet 1    Allergies   Allergen Reactions    Hydralazine Palpitations and Other (See Comments)     \"Pounding heart, throbbing all over, heart raising\"    Penicillins Hives     Childhood reaction ~5-6 years old but reports accidentally prescribed penicillin recently and tolerated so think she may have grown out of the allergy     Lisinopril GI Disturbance         Lab Results    Chemistry/lipid CBC Cardiac Enzymes/BNP/TSH/INR   Lab Results   Component Value Date    CHOL 209 (H) 11/04/2024    HDL 55 11/04/2024    TRIG 216 (H) 11/04/2024    BUN 32.4 (H) 11/04/2024     11/04/2024    CO2 23 11/04/2024    Lab Results   Component Value Date    WBC 8.8 05/31/2024    HGB 15.4 05/31/2024    HCT 46.1 05/31/2024    MCV 91 05/31/2024     05/31/2024    Lab Results   Component Value Date    TROPONINI 5.09 (HH) 08/18/2022    BNP 1,016 (H) 08/18/2022    INR 1.19 (H) 05/31/2024        36  minutes spent on the date of encounter doing chart review, review of test results, interpretation with above tests, patient visit, and documentation.        This note has been dictated using voice " recognition software. Any grammatical, typographical, or context distortions are unintentional and inherent to the software

## 2025-02-10 NOTE — PATIENT INSTRUCTIONS
Radha Hansen,    It was a pleasure to see you today at the [facility].     My recommendations after this visit include:    - No medications changes made today    - Please get your lab work next week as planned    - Low sodium diet < 2000 mg/day, daily weight monitoring, and maintain adequate fluid intake at 50 to 60 ounces per day    -Please call if you experience persistent weight gain 2 to 3 pounds 2 days in a row or 5 pounds in a week with shortness of breath, abdominal bloating and leg swelling    - Follow up with Dr. Sanchez or PK Jones in 2 months    - Please call Heart Failure Nurse Line at [phone], if you have any questions or concerns

## 2025-02-10 NOTE — LETTER
2/10/2025    Physician No Ref-Primary  No address on file    RE: Radha Hansen       Dear Colleague,     I had the pleasure of seeing Radha Hansen in the [facility].          Assessment/Recommendations   Assessment:      # Chronic Heart failure with Reduced Ejection Fraction with LVEF 38% per cardiac MRI in 2022 with moderate dilated left ventricle/Ischemic Cardiomyopathy, NYHA class I-II:    Echocardiogram on 7/9/2024 showed LVEF severely reduced to 25 to 30% with moderate to severe global hypokinesia with apical akinesia.  No significant valve disease or pericardial effusion noted.  Decline in LVEF likely due to being off of the heart failure medications.      Current weight is 145-147 lbs    On low salt diet< 2000 mg per day  Reports inadequate fluid intake     Occasionally lightheaded or dizziness when bending forward or stand up.    Patient is well compensated on exam.    Heart failure regimen includes:    -Beta-blocker therapy with metoprolol succinate 25 mg daily    -ARB therapy with Losartan 25 mg daily    -Aldosterone blocker therapy with spironolactone 25 mg    -SGLT2 Inhibitor with Jardiance 10 mg daily -not taking due to cost issue.  Declined assistance with applying for financial assistance.    # Coronary artery disease with occluded mid LAD, proximal RCA: History of MI with total occlusion of mid LAD.  Cardiac MRI August 2022-MRI showed low probability of viability.  No chest pain.    # Dyslipidemia with LDL goal <70: LDL and triglyceride elevated and not at goal in October 2024.  Not taking atorvastatin due to myalgia.    #  Hypertension: Blood pressure today is 152/76.  Not controlled.    Plan/Recommendation:  -Patient declined BMP and NT proBNP checked today.  She would like to do it next week.  -She declined repeat limited echo.  She will call us back when she is willing to have repeat echo.  -If stable renal function, consider increasing losartan for blood pressure  "control.  -Discussed about starting on alternative statin therapy with rosuvastatin.  Patient declined.    Follow up with  Dr. Sanchez or PK Denney in 2 months.  Patient prefers to follow-up in heart failure clinic as needed for now as she is overwhelmed with multiple medical appointments.     The longitudinal plan of care for ischemic cardiomyopathy, heart failure with severely reduced ejection fraction, hypertension, dyslipidemia, coronary artery disease was addressed during this visit.?Due to the added   complexity in care, I will continue to support Radha Hansen in the subsequent management of this   condition(s) and with the ongoing continuity of care of this condition(s)\".     History of Present Illness/Subjective    Ms. Radha Hansen is a 74 year old female with a past medical history of peripheral vascular disease with status post revascularization of right lower extremity, coronary artery disease with occluded LAD, ischemic cardiomyopathy heart failure with reduced ejection fraction diagnosed in 2022, who is seen at [facility] for continue heart failure follow up.    During last heart failure clinic visit, patient reported that she has not picked up her prescription for metoprolol and atorvastatin.  Patient was instructed to start on metoprolol.  Her losartan dose was increased from 12.5 to 25 mg daily.    Patient saw Dr. Sanchez in October.  Her NT proBNP was found elevated in 2000's.  She was recommended to start on Jardiance.  She was also started on spironolactone.  Her repeat blood work was found stable.    Today, Radha reports stable from heart failure standpoint.  She states that she did not  Jardiance due to cost issue.  She declined assistance with applying for  program. She denies fatigue, shortness of breath, dyspnea on exertion, orthopnea, PND, palpitations, chest pain, and abdominal fullness/bloating.  She reports " "feeling some lightheadedness bending forward or with position change which usually lasts briefly.    Patient reported that she underwent revascularization of right lower extremity in January through [facility] vascular clinic.  She reports feeling improvement in her claudication.    ECHO on 7/9/24-Reviewed:   Interpretation Summary   The left ventricle is borderline dilated.  Left ventricular function is decreased. The ejection fraction is 25-30%  (severely reduced).  Moderate to severe global hypokinesia with apical akinesia.  Normal right ventricle size and systolic function.  IVC diameter <2.1 cm collapsing >50% with sniff suggests a normal RA pressure  of 3 mmHg.  No hemodynamically significant valvular abnormalities on 2D or color flow  imaging.     Physical Examination Review of Systems   BP (!) 152/76 (BP Location: Left arm, Patient Position: Sitting, Cuff Size: Adult Regular)   Pulse 79   Resp 16   Ht 1.702 m (5' 7\")   Wt 67.1 kg (148 lb)   BMI 23.18 kg/m    Body mass index is 23.18 kg/m .  Wt Readings from Last 3 Encounters:   02/10/25 67.1 kg (148 lb)   10/14/24 67.8 kg (149 lb 8 oz)   07/12/24 67.6 kg (149 lb)     General Appearance:   no distress, normal body habitus   ENT/Mouth: membranes moist, no oral lesions or bleeding gums.      EYES:  no scleral icterus, normal conjunctivae   Neck: no thyromegaly   Chest/Lungs:   lungs are clear to auscultation, no rales or wheezing, equal chest wall expansion    Cardiovascular:   Heart rate regular. Normal first and second heart sounds with no murmurs, rubs, or gallops; Jugular venous pressure flat, mild pedal edema bilaterally    Abdomen:  no organomegaly, masses, bruits, or tenderness; bowel sounds are present   Extremities   no cyanosis or clubbing    CMS intact.   Skin: no xanthelasma, warm.    Neurologic: Alert and oriented X 3 no tremors   Psychiatric: calm and cooperative                                                   Negative unless noted in HPI "     Medical History  Surgical History Family History Social History   Past Medical History:   Diagnosis Date     Congestive heart failure (H)      Coronary artery disease      Hyperlipidemia      Hypertension      Myocardial infarction (H)      Pulmonary edema 08/18/2022    Past Surgical History:   Procedure Laterality Date     CORONARY ANGIOGRAPHY ADULT ORDER       CV CORONARY ANGIOGRAM N/A 08/19/2022    Procedure: CV CORONARY ANGIOGRAM;  Surgeon: Neeraj Scales MD;  Location: [facility] CATH LAB CV     CV LEFT HEART CATH N/A 08/19/2022    Procedure: Left Heart Catheterization;  Surgeon: eNeraj Scales MD;  Location: [facility] CATH LAB CV     CV LEFT VENTRICULOGRAM N/A 08/19/2022    Procedure: Left Ventriculogram;  Surgeon: Neeraj Scales MD;  Location: [facility] CATH LAB CV    No family history on file. Social History     Socioeconomic History     Marital status: Single     Spouse name: Not on file     Number of children: Not on file     Years of education: Not on file     Highest education level: Not on file   Occupational History     Not on file   Tobacco Use     Smoking status: Former     Types: Cigarettes     Smokeless tobacco: Never   Substance and Sexual Activity     Alcohol use: Not Currently     Drug use: Not Currently     Sexual activity: Not Currently   Other Topics Concern     Not on file   Social History Narrative     Not on file     Social Drivers of Health     Financial Resource Strain: Not on file   Food Insecurity: Not on file   Transportation Needs: Not on file   Physical Activity: Not on file   Stress: Not on file   Social Connections: Not on file   Interpersonal Safety: Not on file   Housing Stability: Not on file          Medications  Allergies   Current Outpatient Medications   Medication Sig Dispense Refill     aspirin (ASA) 81 MG chewable tablet Take 1 tablet (81 mg) by mouth daily 90 tablet 3     clopidogrel (PLAVIX) 75 MG tablet Take 1 tablet by mouth daily at 2 pm.        "empagliflozin (JARDIANCE) 10 MG TABS tablet Take 1 tablet (10 mg) by mouth daily. 90 tablet 1     losartan (COZAAR) 25 MG tablet Take 1 tablet (25 mg) by mouth daily. 90 tablet 1     metoprolol succinate ER (TOPROL XL) 25 MG 24 hr tablet Take 1 tablet (25 mg) by mouth daily. 90 tablet 1     nitroGLYcerin (NITROSTAT) 0.4 MG sublingual tablet For chest pain place 1 tablet under the tongue every 5 minutes for 3 doses. If symptoms persist 5 minutes after 1st dose call 911. (Patient taking differently: Place 0.4 mg under the tongue every 5 minutes as needed for chest pain. For chest pain place 1 tablet under the tongue every 5 minutes for 3 doses. If symptoms persist 5 minutes after 1st dose call 911.) 10 tablet 0     spironolactone (ALDACTONE) 25 MG tablet Take 1 tablet (25 mg) by mouth daily. 90 tablet 3     VITAMIN D-VITAMIN K PO Take 1 capsule by mouth daily Dr. Ibrahim's brand. Contains vitamin D3 10,000 units, vitamin K2 100mcg, K7, zinc 20mg, & magnesium 25mg       atorvastatin (LIPITOR) 40 MG tablet Take 1 tablet (40 mg) by mouth daily (Patient not taking: Reported on 2/10/2025) 90 tablet 1    Allergies   Allergen Reactions     Hydralazine Palpitations and Other (See Comments)     \"Pounding heart, throbbing all over, heart raising\"     Penicillins Hives     Childhood reaction ~5-6 years old but reports accidentally prescribed penicillin recently and tolerated so think she may have grown out of the allergy      Lisinopril GI Disturbance         Lab Results    Chemistry/lipid CBC Cardiac Enzymes/BNP/TSH/INR   Lab Results   Component Value Date    CHOL 209 (H) 11/04/2024    HDL 55 11/04/2024    TRIG 216 (H) 11/04/2024    BUN 32.4 (H) 11/04/2024     11/04/2024    CO2 23 11/04/2024    Lab Results   Component Value Date    WBC 8.8 05/31/2024    HGB 15.4 05/31/2024    HCT 46.1 05/31/2024    MCV 91 05/31/2024     05/31/2024    Lab Results   Component Value Date    TROPONINI 5.09 (HH) 08/18/2022    BNP 1,016 (H) " 08/18/2022    INR 1.19 (H) 05/31/2024        36  minutes spent on the date of encounter doing chart review, review of test results, interpretation with above tests, patient visit, and documentation.        This note has been dictated using voice recognition software. Any grammatical, typographical, or context distortions are unintentional and inherent to the software          Thank you for allowing me to participate in the care of your patient.      Sincerely,     CARMEN Story CNP     [facility]  cc:   CARMEN Story CNP  [address]  [place] [address]

## 2025-03-06 ENCOUNTER — MYC MEDICAL ADVICE (OUTPATIENT)
Dept: CARDIOLOGY | Facility: CLINIC | Age: 75
End: 2025-03-06
Payer: MEDICARE

## 2025-03-25 NOTE — TELEPHONE ENCOUNTER
[place] Community Me[place] Blood Pressure [place] reviewed.  Patients recent blood pressure is outside of guideline parameters.  Called pt to review, no answer.  Left voicemail message asking patient to check their blood pressure using a home blood pressure cuff or by going to a [facility].  Patient instructed to then call [phone] ([facility]) and leave a message with their name, date of birth, and blood pressure reading that was completed within the last 24 hours and where it was completed.  Will await call back for further review.

## 2025-04-01 VITALS — HEART RATE: 88 BPM | SYSTOLIC BLOOD PRESSURE: 125 MMHG | DIASTOLIC BLOOD PRESSURE: 82 MMHG

## 2025-04-01 NOTE — TELEPHONE ENCOUNTER
Patient returned call and left voicemail message with update blood pressure reading.      Last Blood Pressure: 152/76  Last Heart Rate: 79  Date: 02/10/25  Location: [facility]    Today's Blood Pressure: 125/82  Today's Heart Rate: 88  Location: Home BP    Patient reported blood pressure updated in Epic. Blood pressure falls within [place] Community Measures guidelines.  Patient will follow up as previously advised.

## (undated) DEVICE — MANIFOLD KIT ANGIO AUTOMATED 014613

## (undated) DEVICE — CATH ANGIO INFINITI JL3.5 4FRX100CM 538418

## (undated) DEVICE — CUSTOM PACK CORONARY SAN5BCRHEA

## (undated) DEVICE — CATH DIAG 4FR JR 5.0 538423

## (undated) DEVICE — CATH ANGIO INFINITI MPA2 4FRX100CM 2 SH 538442

## (undated) DEVICE — SLEEVE TR BAND RADIAL COMPRESSION DEVICE 24CM TRB24-REG

## (undated) DEVICE — CATH DIAG 4FR ANG PIG 538453S

## (undated) DEVICE — SHEATH GLIDE RADIAL 4FR 25CM 0.021

## (undated) DEVICE — SYR ANGIOGRAPHY MULTIUSE KIT ACIST 014612

## (undated) DEVICE — GUIDEWIRE ROSEN CVD .035X180CM J-TIP G01264

## (undated) DEVICE — ELECTRODE ADULT PACING MULTI P-211-M1

## (undated) DEVICE — KIT HAND CONTROL ACIST 016795

## (undated) DEVICE — 0.035IN X 260CM INQWIRE DIAGNOSTIC GUIDEWIRE, FIXED-CORE PTFE COATED, 3MM J-TIP

## (undated) DEVICE — INTRO MICRO MINI STICK 4FR STD NITINOL